# Patient Record
Sex: FEMALE | Race: BLACK OR AFRICAN AMERICAN | NOT HISPANIC OR LATINO | Employment: OTHER | URBAN - METROPOLITAN AREA
[De-identification: names, ages, dates, MRNs, and addresses within clinical notes are randomized per-mention and may not be internally consistent; named-entity substitution may affect disease eponyms.]

---

## 2017-04-10 PROBLEM — E78.5 HYPERLIPIDEMIA: Status: ACTIVE | Noted: 2017-04-10

## 2017-09-11 PROBLEM — R21 RASH, SKIN: Status: ACTIVE | Noted: 2017-09-11

## 2017-10-18 PROBLEM — Z17.0 MALIGNANT NEOPLASM OF UPPER-OUTER QUADRANT OF LEFT BREAST IN FEMALE, ESTROGEN RECEPTOR POSITIVE: Status: ACTIVE | Noted: 2017-10-18

## 2017-10-18 PROBLEM — G58.9 PINCHED NERVE IN NECK: Status: ACTIVE | Noted: 2017-10-18

## 2017-10-18 PROBLEM — C50.412 MALIGNANT NEOPLASM OF UPPER-OUTER QUADRANT OF LEFT BREAST IN FEMALE, ESTROGEN RECEPTOR POSITIVE: Status: ACTIVE | Noted: 2017-10-18

## 2021-07-09 ENCOUNTER — HOSPITAL ENCOUNTER (OUTPATIENT)
Facility: HOSPITAL | Age: 86
Discharge: HOME | End: 2021-07-09
Attending: EMERGENCY MEDICINE | Admitting: EMERGENCY MEDICINE
Payer: MEDICARE

## 2021-07-09 VITALS
BODY MASS INDEX: 32.2 KG/M2 | TEMPERATURE: 99 F | HEIGHT: 62 IN | SYSTOLIC BLOOD PRESSURE: 154 MMHG | HEART RATE: 72 BPM | OXYGEN SATURATION: 96 % | DIASTOLIC BLOOD PRESSURE: 63 MMHG | RESPIRATION RATE: 18 BRPM | WEIGHT: 175 LBS

## 2021-07-09 DIAGNOSIS — I21.4 NSTEMI (NON-ST ELEVATED MYOCARDIAL INFARCTION): Primary | ICD-10-CM

## 2021-07-09 DIAGNOSIS — R06.02 SHORTNESS OF BREATH: ICD-10-CM

## 2021-07-09 DIAGNOSIS — I50.31 ACUTE DIASTOLIC CONGESTIVE HEART FAILURE: ICD-10-CM

## 2021-07-09 DIAGNOSIS — R07.9 CHEST PAIN: ICD-10-CM

## 2021-07-09 DIAGNOSIS — I50.9 CHF (CONGESTIVE HEART FAILURE): ICD-10-CM

## 2021-07-09 DIAGNOSIS — R53.81 PHYSICAL DECONDITIONING: ICD-10-CM

## 2021-07-09 PROBLEM — E83.51 HYPOCALCEMIA: Status: ACTIVE | Noted: 2020-01-24

## 2021-07-09 PROBLEM — E87.5 HYPERKALEMIA: Status: ACTIVE | Noted: 2021-06-26

## 2021-07-09 PROBLEM — I35.0 NONRHEUMATIC AORTIC VALVE STENOSIS: Status: ACTIVE | Noted: 2020-09-27

## 2021-07-09 PROBLEM — C79.51 SECONDARY MALIGNANT NEOPLASM OF BONE: Status: ACTIVE | Noted: 2018-03-29

## 2021-07-09 PROBLEM — E11.9 DIABETES MELLITUS: Status: ACTIVE | Noted: 2019-02-07

## 2021-07-09 PROBLEM — I50.33 ACUTE ON CHRONIC DIASTOLIC (CONGESTIVE) HEART FAILURE: Status: ACTIVE | Noted: 2020-01-24

## 2021-07-09 PROBLEM — E83.42 HYPOMAGNESEMIA: Status: ACTIVE | Noted: 2020-01-24

## 2021-07-09 PROBLEM — E73.9 LACTOSE INTOLERANCE: Status: ACTIVE | Noted: 2020-11-02

## 2021-07-09 PROBLEM — I48.91 ATRIAL FIBRILLATION WITH RAPID VENTRICULAR RESPONSE: Status: ACTIVE | Noted: 2020-08-30

## 2021-07-09 PROBLEM — I35.0 SEVERE AORTIC STENOSIS: Status: ACTIVE | Noted: 2020-09-08

## 2021-07-09 PROBLEM — R79.89 ELEVATED TROPONIN: Status: ACTIVE | Noted: 2020-08-29

## 2021-07-09 PROBLEM — I97.2 POSTMASTECTOMY LYMPHEDEMA SYNDROME: Status: ACTIVE | Noted: 2019-02-07

## 2021-07-09 PROBLEM — I35.2 AORTIC VALVE STENOSIS WITH INSUFFICIENCY: Status: ACTIVE | Noted: 2020-09-18

## 2021-07-09 PROBLEM — I08.0 STENOSIS OF AORTIC AND MITRAL VALVES: Status: ACTIVE | Noted: 2021-07-09

## 2021-07-09 PROBLEM — F33.0 MAJOR DEPRESSIVE DISORDER, RECURRENT EPISODE, MILD: Status: ACTIVE | Noted: 2020-10-01

## 2021-07-09 PROBLEM — E61.1 IRON DEFICIENCY: Status: ACTIVE | Noted: 2020-11-02

## 2021-07-09 PROBLEM — I34.81 MITRAL ANNULAR CALCIFICATION: Status: ACTIVE | Noted: 2021-07-09

## 2021-07-09 LAB
ALBUMIN SERPL BCP-MCNC: 3.4 G/DL (ref 3.5–5.2)
ALBUMIN SERPL BCP-MCNC: 3.4 G/DL (ref 3.5–5.2)
ALLENS TEST: ABNORMAL
ALP SERPL-CCNC: 72 U/L (ref 55–135)
ALP SERPL-CCNC: 75 U/L (ref 55–135)
ALT SERPL W/O P-5'-P-CCNC: 45 U/L (ref 10–44)
ALT SERPL W/O P-5'-P-CCNC: 49 U/L (ref 10–44)
ANION GAP SERPL CALC-SCNC: 11 MMOL/L (ref 8–16)
ANION GAP SERPL CALC-SCNC: 13 MMOL/L (ref 8–16)
AORTIC ROOT ANNULUS: 4.41 CM
AORTIC VALVE CUSP SEPERATION: 1.18 CM
ASCENDING AORTA: 3.28 CM
AST SERPL-CCNC: 53 U/L (ref 10–40)
AST SERPL-CCNC: 64 U/L (ref 10–40)
AV INDEX (PROSTH): 0.26
AV MEAN GRADIENT: 51 MMHG
AV PEAK GRADIENT: 84 MMHG
AV VALVE AREA: 0.77 CM2
AV VELOCITY RATIO: 0.29
BASOPHILS # BLD AUTO: 0.02 K/UL (ref 0–0.2)
BASOPHILS # BLD AUTO: 0.03 K/UL (ref 0–0.2)
BASOPHILS NFR BLD: 0.2 % (ref 0–1.9)
BASOPHILS NFR BLD: 0.3 % (ref 0–1.9)
BILIRUB SERPL-MCNC: 0.5 MG/DL (ref 0.1–1)
BILIRUB SERPL-MCNC: 0.6 MG/DL (ref 0.1–1)
BNP SERPL-MCNC: 612 PG/ML (ref 0–99)
BSA FOR ECHO PROCEDURE: 1.86 M2
BUN SERPL-MCNC: 14 MG/DL (ref 8–23)
BUN SERPL-MCNC: 14 MG/DL (ref 8–23)
CALCIUM SERPL-MCNC: 9.4 MG/DL (ref 8.7–10.5)
CALCIUM SERPL-MCNC: 9.4 MG/DL (ref 8.7–10.5)
CHLORIDE SERPL-SCNC: 101 MMOL/L (ref 95–110)
CHLORIDE SERPL-SCNC: 104 MMOL/L (ref 95–110)
CHOLEST SERPL-MCNC: 167 MG/DL (ref 120–199)
CHOLEST/HDLC SERPL: 3.3 {RATIO} (ref 2–5)
CO2 SERPL-SCNC: 23 MMOL/L (ref 23–29)
CO2 SERPL-SCNC: 23 MMOL/L (ref 23–29)
CREAT SERPL-MCNC: 0.9 MG/DL (ref 0.5–1.4)
CREAT SERPL-MCNC: 1 MG/DL (ref 0.5–1.4)
CTP QC/QA: YES
CV ECHO LV RWT: 1.23 CM
DELSYS: ABNORMAL
DIFFERENTIAL METHOD: ABNORMAL
DIFFERENTIAL METHOD: ABNORMAL
DOP CALC AO PEAK VEL: 4.58 M/S
DOP CALC AO VTI: 107.99 CM
DOP CALC LVOT AREA: 3 CM2
DOP CALC LVOT DIAMETER: 1.96 CM
DOP CALC LVOT PEAK VEL: 1.31 M/S
DOP CALC LVOT STROKE VOLUME: 83.68 CM3
DOP CALCLVOT PEAK VEL VTI: 27.75 CM
E WAVE DECELERATION TIME: 380.12 MSEC
E/A RATIO: 0.75
E/E' RATIO: 38.29 M/S
ECHO LV POSTERIOR WALL: 1.96 CM (ref 0.6–1.1)
EJECTION FRACTION: 60 %
EOSINOPHIL # BLD AUTO: 0 K/UL (ref 0–0.5)
EOSINOPHIL # BLD AUTO: 0.1 K/UL (ref 0–0.5)
EOSINOPHIL NFR BLD: 0 % (ref 0–8)
EOSINOPHIL NFR BLD: 0.7 % (ref 0–8)
EP: 5
ERYTHROCYTE [DISTWIDTH] IN BLOOD BY AUTOMATED COUNT: 13.4 % (ref 11.5–14.5)
ERYTHROCYTE [DISTWIDTH] IN BLOOD BY AUTOMATED COUNT: 13.6 % (ref 11.5–14.5)
ERYTHROCYTE [SEDIMENTATION RATE] IN BLOOD BY WESTERGREN METHOD: 12 MM/H
EST. GFR  (AFRICAN AMERICAN): 60 ML/MIN/1.73 M^2
EST. GFR  (AFRICAN AMERICAN): >60 ML/MIN/1.73 M^2
EST. GFR  (NON AFRICAN AMERICAN): 52 ML/MIN/1.73 M^2
EST. GFR  (NON AFRICAN AMERICAN): 59 ML/MIN/1.73 M^2
FIO2: 35
FRACTIONAL SHORTENING: 32 % (ref 28–44)
GLUCOSE SERPL-MCNC: 131 MG/DL (ref 70–110)
GLUCOSE SERPL-MCNC: 154 MG/DL (ref 70–110)
HCO3 UR-SCNC: 29.4 MMOL/L (ref 24–28)
HCT VFR BLD AUTO: 33.4 % (ref 37–48.5)
HCT VFR BLD AUTO: 34.5 % (ref 37–48.5)
HDLC SERPL-MCNC: 51 MG/DL (ref 40–75)
HDLC SERPL: 30.5 % (ref 20–50)
HGB BLD-MCNC: 11.5 G/DL (ref 12–16)
HGB BLD-MCNC: 11.5 G/DL (ref 12–16)
IMM GRANULOCYTES # BLD AUTO: 0.04 K/UL (ref 0–0.04)
IMM GRANULOCYTES # BLD AUTO: 0.05 K/UL (ref 0–0.04)
IMM GRANULOCYTES NFR BLD AUTO: 0.5 % (ref 0–0.5)
IMM GRANULOCYTES NFR BLD AUTO: 0.5 % (ref 0–0.5)
INR PPP: 1.1 (ref 0.8–1.2)
INTERVENTRICULAR SEPTUM: 2.01 CM (ref 0.6–1.1)
IP: 10
IVRT: 113.8 MSEC
LA MAJOR: 6.73 CM
LA MINOR: 6.73 CM
LA WIDTH: 5.17 CM
LDLC SERPL CALC-MCNC: 101.4 MG/DL (ref 63–159)
LEFT ATRIUM SIZE: 2.75 CM
LEFT ATRIUM VOLUME INDEX: 44.9 ML/M2
LEFT ATRIUM VOLUME: 81.33 CM3
LEFT INTERNAL DIMENSION IN SYSTOLE: 2.17 CM (ref 2.1–4)
LEFT VENTRICLE DIASTOLIC VOLUME INDEX: 22.31 ML/M2
LEFT VENTRICLE DIASTOLIC VOLUME: 40.39 ML
LEFT VENTRICLE MASS INDEX: 154 G/M2
LEFT VENTRICLE SYSTOLIC VOLUME INDEX: 8.7 ML/M2
LEFT VENTRICLE SYSTOLIC VOLUME: 15.74 ML
LEFT VENTRICULAR INTERNAL DIMENSION IN DIASTOLE: 3.18 CM (ref 3.5–6)
LEFT VENTRICULAR MASS: 277.96 G
LV LATERAL E/E' RATIO: 33.5 M/S
LV SEPTAL E/E' RATIO: 44.67 M/S
LYMPHOCYTES # BLD AUTO: 0.7 K/UL (ref 1–4.8)
LYMPHOCYTES # BLD AUTO: 1.2 K/UL (ref 1–4.8)
LYMPHOCYTES NFR BLD: 12.5 % (ref 18–48)
LYMPHOCYTES NFR BLD: 8.5 % (ref 18–48)
MAGNESIUM SERPL-MCNC: 1.7 MG/DL (ref 1.6–2.6)
MAGNESIUM SERPL-MCNC: 1.9 MG/DL (ref 1.6–2.6)
MCH RBC QN AUTO: 32.1 PG (ref 27–31)
MCH RBC QN AUTO: 32.2 PG (ref 27–31)
MCHC RBC AUTO-ENTMCNC: 33.3 G/DL (ref 32–36)
MCHC RBC AUTO-ENTMCNC: 34.4 G/DL (ref 32–36)
MCV RBC AUTO: 93 FL (ref 82–98)
MCV RBC AUTO: 97 FL (ref 82–98)
MIN VOL: 8
MODE: ABNORMAL
MONOCYTES # BLD AUTO: 0.5 K/UL (ref 0.3–1)
MONOCYTES # BLD AUTO: 0.6 K/UL (ref 0.3–1)
MONOCYTES NFR BLD: 5.7 % (ref 4–15)
MONOCYTES NFR BLD: 6.9 % (ref 4–15)
MV MEAN GRADIENT: 1 MMHG
MV PEAK A VEL: 1.78 M/S
MV PEAK E VEL: 1.34 M/S
MV PEAK GRADIENT: 15 MMHG
MV STENOSIS PRESSURE HALF TIME: 110.23 MS
MV VALVE AREA P 1/2 METHOD: 2 CM2
NEUTROPHILS # BLD AUTO: 6.8 K/UL (ref 1.8–7.7)
NEUTROPHILS # BLD AUTO: 7.4 K/UL (ref 1.8–7.7)
NEUTROPHILS NFR BLD: 80.3 % (ref 38–73)
NEUTROPHILS NFR BLD: 83.9 % (ref 38–73)
NONHDLC SERPL-MCNC: 116 MG/DL
NRBC BLD-RTO: 0 /100 WBC
NRBC BLD-RTO: 0 /100 WBC
PCO2 BLDA: 49.5 MMHG (ref 35–45)
PH SMN: 7.38 [PH] (ref 7.35–7.45)
PHOSPHATE SERPL-MCNC: 3.9 MG/DL (ref 2.7–4.5)
PISA TR MAX VEL: 2.56 M/S
PLATELET # BLD AUTO: 197 K/UL (ref 150–450)
PLATELET # BLD AUTO: 212 K/UL (ref 150–450)
PMV BLD AUTO: 10.1 FL (ref 9.2–12.9)
PMV BLD AUTO: 10.9 FL (ref 9.2–12.9)
PO2 BLDA: 43 MMHG (ref 40–60)
POC BE: 4 MMOL/L
POC SATURATED O2: 77 % (ref 95–100)
POC TCO2: 31 MMOL/L (ref 24–29)
POCT GLUCOSE: 111 MG/DL (ref 70–110)
POCT GLUCOSE: 114 MG/DL (ref 70–110)
POCT GLUCOSE: 119 MG/DL (ref 70–110)
POTASSIUM SERPL-SCNC: 3.8 MMOL/L (ref 3.5–5.1)
POTASSIUM SERPL-SCNC: 4.4 MMOL/L (ref 3.5–5.1)
PROT SERPL-MCNC: 6.4 G/DL (ref 6–8.4)
PROT SERPL-MCNC: 6.8 G/DL (ref 6–8.4)
PROTHROMBIN TIME: 11.6 SEC (ref 9–12.5)
PULM VEIN S/D RATIO: 0.88
PV PEAK D VEL: 0.33 M/S
PV PEAK S VEL: 0.29 M/S
PV PEAK VELOCITY: 1.11 CM/S
RA MAJOR: 5.9 CM
RA PRESSURE: 3 MMHG
RA WIDTH: 2.75 CM
RBC # BLD AUTO: 3.57 M/UL (ref 4–5.4)
RBC # BLD AUTO: 3.58 M/UL (ref 4–5.4)
RIGHT VENTRICULAR END-DIASTOLIC DIMENSION: 3.72 CM
RV TISSUE DOPPLER FREE WALL SYSTOLIC VELOCITY 1 (APICAL 4 CHAMBER VIEW): 7.78 CM/S
SAMPLE: ABNORMAL
SARS-COV-2 RDRP RESP QL NAA+PROBE: NEGATIVE
SINUS: 4.04 CM
SITE: ABNORMAL
SODIUM SERPL-SCNC: 137 MMOL/L (ref 136–145)
SODIUM SERPL-SCNC: 138 MMOL/L (ref 136–145)
SP02: 97
SPONT RATE: 23
STJ: 3.17 CM
T4 FREE SERPL-MCNC: 1.09 NG/DL (ref 0.71–1.51)
TDI LATERAL: 0.04 M/S
TDI SEPTAL: 0.03 M/S
TDI: 0.04 M/S
TR MAX PG: 26 MMHG
TRICUSPID ANNULAR PLANE SYSTOLIC EXCURSION: 1.29 CM
TRIGL SERPL-MCNC: 73 MG/DL (ref 30–150)
TROPONIN I SERPL DL<=0.01 NG/ML-MCNC: 0.44 NG/ML (ref 0–0.03)
TROPONIN I SERPL DL<=0.01 NG/ML-MCNC: 0.49 NG/ML (ref 0–0.03)
TROPONIN I SERPL DL<=0.01 NG/ML-MCNC: 0.49 NG/ML (ref 0–0.03)
TSH SERPL DL<=0.005 MIU/L-ACNC: 6.76 UIU/ML (ref 0.4–4)
TV REST PULMONARY ARTERY PRESSURE: 29 MMHG
WBC # BLD AUTO: 8.11 K/UL (ref 3.9–12.7)
WBC # BLD AUTO: 9.17 K/UL (ref 3.9–12.7)

## 2021-07-09 PROCEDURE — 93010 EKG 12-LEAD: ICD-10-PCS | Mod: ,,, | Performed by: INTERNAL MEDICINE

## 2021-07-09 PROCEDURE — 25000003 PHARM REV CODE 250: Performed by: NURSE PRACTITIONER

## 2021-07-09 PROCEDURE — 83036 HEMOGLOBIN GLYCOSYLATED A1C: CPT | Performed by: NURSE PRACTITIONER

## 2021-07-09 PROCEDURE — 93005 ELECTROCARDIOGRAM TRACING: CPT

## 2021-07-09 PROCEDURE — 27000221 HC OXYGEN, UP TO 24 HOURS

## 2021-07-09 PROCEDURE — 80053 COMPREHEN METABOLIC PANEL: CPT | Mod: 91 | Performed by: EMERGENCY MEDICINE

## 2021-07-09 PROCEDURE — 99220 PR INITIAL OBSERVATION CARE,LEVL III: ICD-10-PCS | Mod: 25,GW,, | Performed by: INTERNAL MEDICINE

## 2021-07-09 PROCEDURE — 82803 BLOOD GASES ANY COMBINATION: CPT

## 2021-07-09 PROCEDURE — 80053 COMPREHEN METABOLIC PANEL: CPT | Performed by: NURSE PRACTITIONER

## 2021-07-09 PROCEDURE — 82962 GLUCOSE BLOOD TEST: CPT

## 2021-07-09 PROCEDURE — 84484 ASSAY OF TROPONIN QUANT: CPT | Mod: 91 | Performed by: NURSE PRACTITIONER

## 2021-07-09 PROCEDURE — 96376 TX/PRO/DX INJ SAME DRUG ADON: CPT | Mod: 59

## 2021-07-09 PROCEDURE — 25000003 PHARM REV CODE 250: Performed by: EMERGENCY MEDICINE

## 2021-07-09 PROCEDURE — 94660 CPAP INITIATION&MGMT: CPT

## 2021-07-09 PROCEDURE — 93010 ELECTROCARDIOGRAM REPORT: CPT | Mod: ,,, | Performed by: INTERNAL MEDICINE

## 2021-07-09 PROCEDURE — 99900035 HC TECH TIME PER 15 MIN (STAT)

## 2021-07-09 PROCEDURE — 96374 THER/PROPH/DIAG INJ IV PUSH: CPT | Mod: 59

## 2021-07-09 PROCEDURE — 99220 PR INITIAL OBSERVATION CARE,LEVL III: CPT | Mod: 25,GW,, | Performed by: INTERNAL MEDICINE

## 2021-07-09 PROCEDURE — 94640 AIRWAY INHALATION TREATMENT: CPT

## 2021-07-09 PROCEDURE — 97165 OT EVAL LOW COMPLEX 30 MIN: CPT

## 2021-07-09 PROCEDURE — 83735 ASSAY OF MAGNESIUM: CPT | Mod: 91 | Performed by: EMERGENCY MEDICINE

## 2021-07-09 PROCEDURE — 36415 COLL VENOUS BLD VENIPUNCTURE: CPT | Performed by: NURSE PRACTITIONER

## 2021-07-09 PROCEDURE — 84439 ASSAY OF FREE THYROXINE: CPT | Performed by: NURSE PRACTITIONER

## 2021-07-09 PROCEDURE — 85610 PROTHROMBIN TIME: CPT | Performed by: NURSE PRACTITIONER

## 2021-07-09 PROCEDURE — U0002 COVID-19 LAB TEST NON-CDC: HCPCS | Performed by: EMERGENCY MEDICINE

## 2021-07-09 PROCEDURE — 99291 CRITICAL CARE FIRST HOUR: CPT | Mod: 25

## 2021-07-09 PROCEDURE — 96372 THER/PROPH/DIAG INJ SC/IM: CPT | Mod: 59

## 2021-07-09 PROCEDURE — 84484 ASSAY OF TROPONIN QUANT: CPT | Performed by: EMERGENCY MEDICINE

## 2021-07-09 PROCEDURE — G0378 HOSPITAL OBSERVATION PER HR: HCPCS

## 2021-07-09 PROCEDURE — 63600175 PHARM REV CODE 636 W HCPCS: Performed by: NURSE PRACTITIONER

## 2021-07-09 PROCEDURE — 85025 COMPLETE CBC W/AUTO DIFF WBC: CPT | Performed by: NURSE PRACTITIONER

## 2021-07-09 PROCEDURE — 84100 ASSAY OF PHOSPHORUS: CPT | Performed by: NURSE PRACTITIONER

## 2021-07-09 PROCEDURE — 27000190 HC CPAP FULL FACE MASK W/VALVE

## 2021-07-09 PROCEDURE — 83880 ASSAY OF NATRIURETIC PEPTIDE: CPT | Performed by: EMERGENCY MEDICINE

## 2021-07-09 PROCEDURE — 97161 PT EVAL LOW COMPLEX 20 MIN: CPT

## 2021-07-09 PROCEDURE — 84443 ASSAY THYROID STIM HORMONE: CPT | Performed by: NURSE PRACTITIONER

## 2021-07-09 PROCEDURE — 83735 ASSAY OF MAGNESIUM: CPT | Performed by: NURSE PRACTITIONER

## 2021-07-09 PROCEDURE — 80061 LIPID PANEL: CPT | Performed by: NURSE PRACTITIONER

## 2021-07-09 PROCEDURE — 25000242 PHARM REV CODE 250 ALT 637 W/ HCPCS: Performed by: EMERGENCY MEDICINE

## 2021-07-09 PROCEDURE — 63600175 PHARM REV CODE 636 W HCPCS: Performed by: EMERGENCY MEDICINE

## 2021-07-09 PROCEDURE — 85025 COMPLETE CBC W/AUTO DIFF WBC: CPT | Mod: 91 | Performed by: EMERGENCY MEDICINE

## 2021-07-09 RX ORDER — IBUPROFEN 200 MG
16 TABLET ORAL
Status: DISCONTINUED | OUTPATIENT
Start: 2021-07-09 | End: 2021-07-09 | Stop reason: HOSPADM

## 2021-07-09 RX ORDER — NITROGLYCERIN 0.4 MG/1
0.4 TABLET SUBLINGUAL EVERY 5 MIN PRN
Status: DISCONTINUED | OUTPATIENT
Start: 2021-07-09 | End: 2021-07-09 | Stop reason: HOSPADM

## 2021-07-09 RX ORDER — ATORVASTATIN CALCIUM 10 MG/1
20 TABLET, FILM COATED ORAL DAILY
Status: DISCONTINUED | OUTPATIENT
Start: 2021-07-09 | End: 2021-07-09 | Stop reason: HOSPADM

## 2021-07-09 RX ORDER — INSULIN ASPART 100 [IU]/ML
0-5 INJECTION, SOLUTION INTRAVENOUS; SUBCUTANEOUS
Status: DISCONTINUED | OUTPATIENT
Start: 2021-07-09 | End: 2021-07-09 | Stop reason: HOSPADM

## 2021-07-09 RX ORDER — FUROSEMIDE 20 MG/1
20 TABLET ORAL DAILY
Qty: 30 TABLET | Refills: 11 | Status: ON HOLD | OUTPATIENT
Start: 2021-07-09 | End: 2022-01-02 | Stop reason: SDUPTHER

## 2021-07-09 RX ORDER — ACETAMINOPHEN 500 MG
500 TABLET ORAL EVERY 6 HOURS PRN
Status: DISCONTINUED | OUTPATIENT
Start: 2021-07-09 | End: 2021-07-09 | Stop reason: HOSPADM

## 2021-07-09 RX ORDER — METOPROLOL SUCCINATE 25 MG/1
25 TABLET, EXTENDED RELEASE ORAL DAILY
Status: DISCONTINUED | OUTPATIENT
Start: 2021-07-09 | End: 2021-07-09 | Stop reason: HOSPADM

## 2021-07-09 RX ORDER — ONDANSETRON 2 MG/ML
4 INJECTION INTRAMUSCULAR; INTRAVENOUS EVERY 8 HOURS PRN
Status: DISCONTINUED | OUTPATIENT
Start: 2021-07-09 | End: 2021-07-09 | Stop reason: HOSPADM

## 2021-07-09 RX ORDER — FUROSEMIDE 10 MG/ML
40 INJECTION INTRAMUSCULAR; INTRAVENOUS
Status: DISCONTINUED | OUTPATIENT
Start: 2021-07-09 | End: 2021-07-09 | Stop reason: HOSPADM

## 2021-07-09 RX ORDER — CLOPIDOGREL BISULFATE 75 MG/1
75 TABLET ORAL DAILY
Status: DISCONTINUED | OUTPATIENT
Start: 2021-07-10 | End: 2021-07-09 | Stop reason: HOSPADM

## 2021-07-09 RX ORDER — IPRATROPIUM BROMIDE AND ALBUTEROL SULFATE 2.5; .5 MG/3ML; MG/3ML
3 SOLUTION RESPIRATORY (INHALATION)
Status: COMPLETED | OUTPATIENT
Start: 2021-07-09 | End: 2021-07-09

## 2021-07-09 RX ORDER — LOSARTAN POTASSIUM 25 MG/1
50 TABLET ORAL DAILY
Refills: 0 | Status: DISCONTINUED | OUTPATIENT
Start: 2021-07-09 | End: 2021-07-09 | Stop reason: HOSPADM

## 2021-07-09 RX ORDER — CLOPIDOGREL 300 MG/1
300 TABLET, FILM COATED ORAL
Status: COMPLETED | OUTPATIENT
Start: 2021-07-09 | End: 2021-07-09

## 2021-07-09 RX ORDER — FUROSEMIDE 10 MG/ML
40 INJECTION INTRAMUSCULAR; INTRAVENOUS
Status: COMPLETED | OUTPATIENT
Start: 2021-07-09 | End: 2021-07-09

## 2021-07-09 RX ORDER — IPRATROPIUM BROMIDE AND ALBUTEROL SULFATE 2.5; .5 MG/3ML; MG/3ML
3 SOLUTION RESPIRATORY (INHALATION) EVERY 6 HOURS PRN
Qty: 1 BOX | Refills: 0 | Status: SHIPPED | OUTPATIENT
Start: 2021-07-09 | End: 2022-07-09

## 2021-07-09 RX ORDER — GLUCAGON 1 MG
1 KIT INJECTION
Status: DISCONTINUED | OUTPATIENT
Start: 2021-07-09 | End: 2021-07-09 | Stop reason: HOSPADM

## 2021-07-09 RX ORDER — ASPIRIN 81 MG/1
81 TABLET ORAL DAILY
Status: DISCONTINUED | OUTPATIENT
Start: 2021-07-10 | End: 2021-07-09 | Stop reason: HOSPADM

## 2021-07-09 RX ORDER — ATORVASTATIN CALCIUM 20 MG/1
20 TABLET, FILM COATED ORAL DAILY
Qty: 90 TABLET | Refills: 3 | Status: SHIPPED | OUTPATIENT
Start: 2021-07-10 | End: 2022-07-10

## 2021-07-09 RX ORDER — CALCIUM CARBONATE 200(500)MG
500 TABLET,CHEWABLE ORAL 3 TIMES DAILY PRN
Status: DISCONTINUED | OUTPATIENT
Start: 2021-07-09 | End: 2021-07-09 | Stop reason: HOSPADM

## 2021-07-09 RX ORDER — CLOPIDOGREL BISULFATE 75 MG/1
75 TABLET ORAL DAILY
Qty: 30 TABLET | Refills: 11 | Status: ON HOLD | OUTPATIENT
Start: 2021-07-10 | End: 2022-01-02 | Stop reason: HOSPADM

## 2021-07-09 RX ORDER — ASPIRIN 81 MG/1
81 TABLET ORAL DAILY
Refills: 0
Start: 2021-07-10 | End: 2022-07-10

## 2021-07-09 RX ORDER — TALC
6 POWDER (GRAM) TOPICAL NIGHTLY PRN
Status: DISCONTINUED | OUTPATIENT
Start: 2021-07-09 | End: 2021-07-09 | Stop reason: HOSPADM

## 2021-07-09 RX ORDER — NAPROXEN SODIUM 220 MG/1
81 TABLET, FILM COATED ORAL
Status: COMPLETED | OUTPATIENT
Start: 2021-07-09 | End: 2021-07-09

## 2021-07-09 RX ORDER — AMOXICILLIN 250 MG
1 CAPSULE ORAL 2 TIMES DAILY
Status: DISCONTINUED | OUTPATIENT
Start: 2021-07-09 | End: 2021-07-09 | Stop reason: HOSPADM

## 2021-07-09 RX ORDER — ENOXAPARIN SODIUM 100 MG/ML
1 INJECTION SUBCUTANEOUS ONCE
Status: COMPLETED | OUTPATIENT
Start: 2021-07-09 | End: 2021-07-09

## 2021-07-09 RX ORDER — IBUPROFEN 200 MG
24 TABLET ORAL
Status: DISCONTINUED | OUTPATIENT
Start: 2021-07-09 | End: 2021-07-09 | Stop reason: HOSPADM

## 2021-07-09 RX ORDER — ENOXAPARIN SODIUM 100 MG/ML
40 INJECTION SUBCUTANEOUS EVERY 24 HOURS
Status: DISCONTINUED | OUTPATIENT
Start: 2021-07-09 | End: 2021-07-09 | Stop reason: HOSPADM

## 2021-07-09 RX ORDER — SODIUM CHLORIDE 0.9 % (FLUSH) 0.9 %
10 SYRINGE (ML) INJECTION
Status: DISCONTINUED | OUTPATIENT
Start: 2021-07-09 | End: 2021-07-09 | Stop reason: HOSPADM

## 2021-07-09 RX ADMIN — ASPIRIN 81 MG CHEWABLE TABLET 81 MG: 81 TABLET CHEWABLE at 04:07

## 2021-07-09 RX ADMIN — IPRATROPIUM BROMIDE AND ALBUTEROL SULFATE 3 ML: .5; 3 SOLUTION RESPIRATORY (INHALATION) at 02:07

## 2021-07-09 RX ADMIN — ATORVASTATIN CALCIUM 20 MG: 10 TABLET, FILM COATED ORAL at 12:07

## 2021-07-09 RX ADMIN — METOPROLOL SUCCINATE 25 MG: 25 TABLET, EXTENDED RELEASE ORAL at 12:07

## 2021-07-09 RX ADMIN — DOCUSATE SODIUM 50 MG AND SENNOSIDES 8.6 MG 1 TABLET: 8.6; 5 TABLET, FILM COATED ORAL at 10:07

## 2021-07-09 RX ADMIN — CLOPIDOGREL BISULFATE 300 MG: 300 TABLET, FILM COATED ORAL at 04:07

## 2021-07-09 RX ADMIN — ENOXAPARIN SODIUM 80 MG: 80 INJECTION SUBCUTANEOUS at 04:07

## 2021-07-09 RX ADMIN — FUROSEMIDE 40 MG: 10 INJECTION, SOLUTION INTRAMUSCULAR; INTRAVENOUS at 12:07

## 2021-07-09 RX ADMIN — LOSARTAN POTASSIUM 50 MG: 25 TABLET, FILM COATED ORAL at 12:07

## 2021-07-09 RX ADMIN — FUROSEMIDE 40 MG: 10 INJECTION INTRAMUSCULAR; INTRAVENOUS at 10:07

## 2021-07-09 RX ADMIN — IPRATROPIUM BROMIDE AND ALBUTEROL SULFATE 3 ML: .5; 3 SOLUTION RESPIRATORY (INHALATION) at 12:07

## 2021-07-10 LAB
ESTIMATED AVG GLUCOSE: 131 MG/DL (ref 68–131)
HBA1C MFR BLD: 6.2 % (ref 4–5.6)

## 2021-07-27 ENCOUNTER — LAB VISIT (OUTPATIENT)
Dept: LAB | Facility: OTHER | Age: 86
End: 2021-07-27
Payer: MEDICARE

## 2021-07-27 DIAGNOSIS — Z20.822 ENCOUNTER FOR LABORATORY TESTING FOR COVID-19 VIRUS: ICD-10-CM

## 2021-07-27 PROCEDURE — U0003 INFECTIOUS AGENT DETECTION BY NUCLEIC ACID (DNA OR RNA); SEVERE ACUTE RESPIRATORY SYNDROME CORONAVIRUS 2 (SARS-COV-2) (CORONAVIRUS DISEASE [COVID-19]), AMPLIFIED PROBE TECHNIQUE, MAKING USE OF HIGH THROUGHPUT TECHNOLOGIES AS DESCRIBED BY CMS-2020-01-R: HCPCS | Performed by: NURSE PRACTITIONER

## 2021-07-28 DIAGNOSIS — Z20.822 ENCOUNTER FOR LABORATORY TESTING FOR COVID-19 VIRUS: ICD-10-CM

## 2021-07-29 LAB
SARS-COV-2 RNA RESP QL NAA+PROBE: NOT DETECTED
SARS-COV-2- CYCLE NUMBER: -1

## 2021-08-03 ENCOUNTER — LAB VISIT (OUTPATIENT)
Dept: LAB | Facility: OTHER | Age: 86
End: 2021-08-03
Payer: MEDICARE

## 2021-08-03 DIAGNOSIS — Z20.822 ENCOUNTER FOR LABORATORY TESTING FOR COVID-19 VIRUS: ICD-10-CM

## 2021-08-03 PROCEDURE — U0003 INFECTIOUS AGENT DETECTION BY NUCLEIC ACID (DNA OR RNA); SEVERE ACUTE RESPIRATORY SYNDROME CORONAVIRUS 2 (SARS-COV-2) (CORONAVIRUS DISEASE [COVID-19]), AMPLIFIED PROBE TECHNIQUE, MAKING USE OF HIGH THROUGHPUT TECHNOLOGIES AS DESCRIBED BY CMS-2020-01-R: HCPCS | Performed by: NURSE PRACTITIONER

## 2021-08-06 LAB
SARS-COV-2 RNA RESP QL NAA+PROBE: NORMAL
TEST PERFORMANCE INFO SPEC: NORMAL

## 2021-08-10 ENCOUNTER — LAB VISIT (OUTPATIENT)
Dept: LAB | Facility: OTHER | Age: 86
End: 2021-08-10
Payer: MEDICARE

## 2021-08-10 DIAGNOSIS — Z20.822 ENCOUNTER FOR LABORATORY TESTING FOR COVID-19 VIRUS: ICD-10-CM

## 2021-08-10 PROCEDURE — U0003 INFECTIOUS AGENT DETECTION BY NUCLEIC ACID (DNA OR RNA); SEVERE ACUTE RESPIRATORY SYNDROME CORONAVIRUS 2 (SARS-COV-2) (CORONAVIRUS DISEASE [COVID-19]), AMPLIFIED PROBE TECHNIQUE, MAKING USE OF HIGH THROUGHPUT TECHNOLOGIES AS DESCRIBED BY CMS-2020-01-R: HCPCS | Performed by: NURSE PRACTITIONER

## 2021-08-11 LAB
SARS-COV-2 RNA RESP QL NAA+PROBE: NOT DETECTED
SARS-COV-2- CYCLE NUMBER: -1

## 2021-08-17 ENCOUNTER — LAB VISIT (OUTPATIENT)
Dept: LAB | Facility: OTHER | Age: 86
End: 2021-08-17
Payer: MEDICARE

## 2021-08-17 DIAGNOSIS — Z20.822 ENCOUNTER FOR LABORATORY TESTING FOR COVID-19 VIRUS: ICD-10-CM

## 2021-08-17 PROCEDURE — U0003 INFECTIOUS AGENT DETECTION BY NUCLEIC ACID (DNA OR RNA); SEVERE ACUTE RESPIRATORY SYNDROME CORONAVIRUS 2 (SARS-COV-2) (CORONAVIRUS DISEASE [COVID-19]), AMPLIFIED PROBE TECHNIQUE, MAKING USE OF HIGH THROUGHPUT TECHNOLOGIES AS DESCRIBED BY CMS-2020-01-R: HCPCS | Performed by: NURSE PRACTITIONER

## 2021-08-18 LAB
SARS-COV-2 RNA RESP QL NAA+PROBE: NOT DETECTED
SARS-COV-2- CYCLE NUMBER: -1

## 2021-08-24 ENCOUNTER — LAB VISIT (OUTPATIENT)
Dept: LAB | Facility: OTHER | Age: 86
End: 2021-08-24
Payer: MEDICARE

## 2021-08-24 DIAGNOSIS — Z20.822 ENCOUNTER FOR LABORATORY TESTING FOR COVID-19 VIRUS: ICD-10-CM

## 2021-08-24 PROCEDURE — U0003 INFECTIOUS AGENT DETECTION BY NUCLEIC ACID (DNA OR RNA); SEVERE ACUTE RESPIRATORY SYNDROME CORONAVIRUS 2 (SARS-COV-2) (CORONAVIRUS DISEASE [COVID-19]), AMPLIFIED PROBE TECHNIQUE, MAKING USE OF HIGH THROUGHPUT TECHNOLOGIES AS DESCRIBED BY CMS-2020-01-R: HCPCS | Performed by: NURSE PRACTITIONER

## 2021-08-25 LAB
SARS-COV-2 RNA RESP QL NAA+PROBE: NORMAL
TEST PERFORMANCE INFO SPEC: NORMAL

## 2021-09-08 ENCOUNTER — LAB VISIT (OUTPATIENT)
Dept: LAB | Facility: OTHER | Age: 86
End: 2021-09-08
Payer: MEDICARE

## 2021-09-08 DIAGNOSIS — Z20.822 ENCOUNTER FOR LABORATORY TESTING FOR COVID-19 VIRUS: ICD-10-CM

## 2021-09-08 PROCEDURE — U0003 INFECTIOUS AGENT DETECTION BY NUCLEIC ACID (DNA OR RNA); SEVERE ACUTE RESPIRATORY SYNDROME CORONAVIRUS 2 (SARS-COV-2) (CORONAVIRUS DISEASE [COVID-19]), AMPLIFIED PROBE TECHNIQUE, MAKING USE OF HIGH THROUGHPUT TECHNOLOGIES AS DESCRIBED BY CMS-2020-01-R: HCPCS | Performed by: NURSE PRACTITIONER

## 2021-09-09 LAB
SARS-COV-2 RNA RESP QL NAA+PROBE: NOT DETECTED
SARS-COV-2- CYCLE NUMBER: NORMAL

## 2021-09-14 ENCOUNTER — LAB VISIT (OUTPATIENT)
Dept: LAB | Facility: OTHER | Age: 86
End: 2021-09-14
Payer: MEDICARE

## 2021-09-14 DIAGNOSIS — Z20.822 ENCOUNTER FOR LABORATORY TESTING FOR COVID-19 VIRUS: ICD-10-CM

## 2021-09-14 PROCEDURE — U0003 INFECTIOUS AGENT DETECTION BY NUCLEIC ACID (DNA OR RNA); SEVERE ACUTE RESPIRATORY SYNDROME CORONAVIRUS 2 (SARS-COV-2) (CORONAVIRUS DISEASE [COVID-19]), AMPLIFIED PROBE TECHNIQUE, MAKING USE OF HIGH THROUGHPUT TECHNOLOGIES AS DESCRIBED BY CMS-2020-01-R: HCPCS | Performed by: NURSE PRACTITIONER

## 2021-09-15 LAB
SARS-COV-2 RNA RESP QL NAA+PROBE: NOT DETECTED
SARS-COV-2- CYCLE NUMBER: NORMAL

## 2021-09-21 ENCOUNTER — LAB VISIT (OUTPATIENT)
Dept: LAB | Facility: OTHER | Age: 86
End: 2021-09-21
Payer: MEDICARE

## 2021-09-21 DIAGNOSIS — Z20.822 ENCOUNTER FOR LABORATORY TESTING FOR COVID-19 VIRUS: ICD-10-CM

## 2021-09-21 PROCEDURE — U0003 INFECTIOUS AGENT DETECTION BY NUCLEIC ACID (DNA OR RNA); SEVERE ACUTE RESPIRATORY SYNDROME CORONAVIRUS 2 (SARS-COV-2) (CORONAVIRUS DISEASE [COVID-19]), AMPLIFIED PROBE TECHNIQUE, MAKING USE OF HIGH THROUGHPUT TECHNOLOGIES AS DESCRIBED BY CMS-2020-01-R: HCPCS | Performed by: NURSE PRACTITIONER

## 2021-09-22 LAB
SARS-COV-2 RNA RESP QL NAA+PROBE: NOT DETECTED
SARS-COV-2- CYCLE NUMBER: NORMAL

## 2021-09-28 ENCOUNTER — LAB VISIT (OUTPATIENT)
Dept: LAB | Facility: OTHER | Age: 86
End: 2021-09-28
Payer: MEDICARE

## 2021-09-28 DIAGNOSIS — Z20.822 ENCOUNTER FOR LABORATORY TESTING FOR COVID-19 VIRUS: ICD-10-CM

## 2021-09-28 PROCEDURE — U0003 INFECTIOUS AGENT DETECTION BY NUCLEIC ACID (DNA OR RNA); SEVERE ACUTE RESPIRATORY SYNDROME CORONAVIRUS 2 (SARS-COV-2) (CORONAVIRUS DISEASE [COVID-19]), AMPLIFIED PROBE TECHNIQUE, MAKING USE OF HIGH THROUGHPUT TECHNOLOGIES AS DESCRIBED BY CMS-2020-01-R: HCPCS | Performed by: NURSE PRACTITIONER

## 2021-09-29 LAB
SARS-COV-2 RNA RESP QL NAA+PROBE: NOT DETECTED
SARS-COV-2- CYCLE NUMBER: NORMAL

## 2021-10-05 ENCOUNTER — LAB VISIT (OUTPATIENT)
Dept: LAB | Facility: OTHER | Age: 86
End: 2021-10-05
Payer: MEDICARE

## 2021-10-05 DIAGNOSIS — Z20.822 ENCOUNTER FOR LABORATORY TESTING FOR COVID-19 VIRUS: ICD-10-CM

## 2021-10-05 PROCEDURE — U0003 INFECTIOUS AGENT DETECTION BY NUCLEIC ACID (DNA OR RNA); SEVERE ACUTE RESPIRATORY SYNDROME CORONAVIRUS 2 (SARS-COV-2) (CORONAVIRUS DISEASE [COVID-19]), AMPLIFIED PROBE TECHNIQUE, MAKING USE OF HIGH THROUGHPUT TECHNOLOGIES AS DESCRIBED BY CMS-2020-01-R: HCPCS | Performed by: NURSE PRACTITIONER

## 2021-10-06 LAB
SARS-COV-2 RNA RESP QL NAA+PROBE: NOT DETECTED
SARS-COV-2- CYCLE NUMBER: NORMAL

## 2021-10-12 ENCOUNTER — LAB VISIT (OUTPATIENT)
Dept: LAB | Facility: OTHER | Age: 86
End: 2021-10-12
Payer: MEDICARE

## 2021-10-12 DIAGNOSIS — Z20.822 ENCOUNTER FOR LABORATORY TESTING FOR COVID-19 VIRUS: ICD-10-CM

## 2021-10-12 PROCEDURE — U0003 INFECTIOUS AGENT DETECTION BY NUCLEIC ACID (DNA OR RNA); SEVERE ACUTE RESPIRATORY SYNDROME CORONAVIRUS 2 (SARS-COV-2) (CORONAVIRUS DISEASE [COVID-19]), AMPLIFIED PROBE TECHNIQUE, MAKING USE OF HIGH THROUGHPUT TECHNOLOGIES AS DESCRIBED BY CMS-2020-01-R: HCPCS | Performed by: NURSE PRACTITIONER

## 2021-10-13 LAB
SARS-COV-2 RNA RESP QL NAA+PROBE: NOT DETECTED
SARS-COV-2- CYCLE NUMBER: NORMAL

## 2021-10-19 ENCOUNTER — LAB VISIT (OUTPATIENT)
Dept: LAB | Facility: OTHER | Age: 86
End: 2021-10-19
Payer: MEDICARE

## 2021-10-19 DIAGNOSIS — Z20.822 ENCOUNTER FOR LABORATORY TESTING FOR COVID-19 VIRUS: ICD-10-CM

## 2021-10-19 PROCEDURE — U0003 INFECTIOUS AGENT DETECTION BY NUCLEIC ACID (DNA OR RNA); SEVERE ACUTE RESPIRATORY SYNDROME CORONAVIRUS 2 (SARS-COV-2) (CORONAVIRUS DISEASE [COVID-19]), AMPLIFIED PROBE TECHNIQUE, MAKING USE OF HIGH THROUGHPUT TECHNOLOGIES AS DESCRIBED BY CMS-2020-01-R: HCPCS | Performed by: NURSE PRACTITIONER

## 2021-10-20 LAB
SARS-COV-2 RNA RESP QL NAA+PROBE: NOT DETECTED
SARS-COV-2- CYCLE NUMBER: NORMAL

## 2021-10-26 ENCOUNTER — LAB VISIT (OUTPATIENT)
Dept: LAB | Facility: OTHER | Age: 86
End: 2021-10-26
Payer: MEDICARE

## 2021-10-26 DIAGNOSIS — Z20.822 ENCOUNTER FOR LABORATORY TESTING FOR COVID-19 VIRUS: ICD-10-CM

## 2021-10-26 PROCEDURE — U0003 INFECTIOUS AGENT DETECTION BY NUCLEIC ACID (DNA OR RNA); SEVERE ACUTE RESPIRATORY SYNDROME CORONAVIRUS 2 (SARS-COV-2) (CORONAVIRUS DISEASE [COVID-19]), AMPLIFIED PROBE TECHNIQUE, MAKING USE OF HIGH THROUGHPUT TECHNOLOGIES AS DESCRIBED BY CMS-2020-01-R: HCPCS | Performed by: NURSE PRACTITIONER

## 2021-10-27 LAB
SARS-COV-2 RNA RESP QL NAA+PROBE: NOT DETECTED
SARS-COV-2- CYCLE NUMBER: NORMAL

## 2021-11-02 ENCOUNTER — LAB VISIT (OUTPATIENT)
Dept: LAB | Facility: OTHER | Age: 86
End: 2021-11-02
Payer: MEDICARE

## 2021-11-02 DIAGNOSIS — Z20.822 ENCOUNTER FOR LABORATORY TESTING FOR COVID-19 VIRUS: ICD-10-CM

## 2021-11-02 PROCEDURE — U0003 INFECTIOUS AGENT DETECTION BY NUCLEIC ACID (DNA OR RNA); SEVERE ACUTE RESPIRATORY SYNDROME CORONAVIRUS 2 (SARS-COV-2) (CORONAVIRUS DISEASE [COVID-19]), AMPLIFIED PROBE TECHNIQUE, MAKING USE OF HIGH THROUGHPUT TECHNOLOGIES AS DESCRIBED BY CMS-2020-01-R: HCPCS | Performed by: NURSE PRACTITIONER

## 2021-11-03 LAB
SARS-COV-2 RNA RESP QL NAA+PROBE: NOT DETECTED
SARS-COV-2- CYCLE NUMBER: NORMAL

## 2021-12-21 ENCOUNTER — LAB VISIT (OUTPATIENT)
Dept: LAB | Facility: OTHER | Age: 86
End: 2021-12-21
Payer: MEDICARE

## 2021-12-21 DIAGNOSIS — Z20.822 ENCOUNTER FOR LABORATORY TESTING FOR COVID-19 VIRUS: ICD-10-CM

## 2021-12-21 LAB
SARS-COV-2 RNA RESP QL NAA+PROBE: NOT DETECTED
SARS-COV-2- CYCLE NUMBER: NORMAL

## 2021-12-21 PROCEDURE — U0003 INFECTIOUS AGENT DETECTION BY NUCLEIC ACID (DNA OR RNA); SEVERE ACUTE RESPIRATORY SYNDROME CORONAVIRUS 2 (SARS-COV-2) (CORONAVIRUS DISEASE [COVID-19]), AMPLIFIED PROBE TECHNIQUE, MAKING USE OF HIGH THROUGHPUT TECHNOLOGIES AS DESCRIBED BY CMS-2020-01-R: HCPCS | Performed by: NURSE PRACTITIONER

## 2021-12-28 DIAGNOSIS — Z20.822 ENCOUNTER FOR LABORATORY TESTING FOR COVID-19 VIRUS: ICD-10-CM

## 2021-12-30 ENCOUNTER — HOSPITAL ENCOUNTER (INPATIENT)
Facility: HOSPITAL | Age: 86
LOS: 5 days | DRG: 208 | End: 2022-01-04
Attending: EMERGENCY MEDICINE | Admitting: FAMILY MEDICINE
Payer: MEDICARE

## 2021-12-30 DIAGNOSIS — Z01.818 ENCOUNTER FOR INTUBATION: ICD-10-CM

## 2021-12-30 DIAGNOSIS — R09.02 HYPOXIA: ICD-10-CM

## 2021-12-30 DIAGNOSIS — Z45.2 ENCOUNTER FOR CENTRAL LINE PLACEMENT: ICD-10-CM

## 2021-12-30 DIAGNOSIS — I95.9 HYPOTENSION: ICD-10-CM

## 2021-12-30 DIAGNOSIS — J81.0 ACUTE PULMONARY EDEMA: Primary | ICD-10-CM

## 2021-12-30 DIAGNOSIS — R06.02 SOB (SHORTNESS OF BREATH): ICD-10-CM

## 2021-12-30 PROBLEM — Z97.8 ENDOTRACHEALLY INTUBATED: Status: ACTIVE | Noted: 2021-12-30

## 2021-12-30 LAB
ALBUMIN SERPL BCP-MCNC: 3.8 G/DL (ref 3.5–5.2)
ALLENS TEST: ABNORMAL
ALP SERPL-CCNC: 62 U/L (ref 55–135)
ALT SERPL W/O P-5'-P-CCNC: 23 U/L (ref 10–44)
ANION GAP SERPL CALC-SCNC: 12 MMOL/L (ref 8–16)
AST SERPL-CCNC: 24 U/L (ref 10–40)
BACTERIA #/AREA URNS HPF: ABNORMAL /HPF
BASOPHILS # BLD AUTO: 0.11 K/UL (ref 0–0.2)
BASOPHILS NFR BLD: 0.7 % (ref 0–1.9)
BILIRUB SERPL-MCNC: 0.4 MG/DL (ref 0.1–1)
BILIRUB UR QL STRIP: NEGATIVE
BNP SERPL-MCNC: 748 PG/ML (ref 0–99)
BUN SERPL-MCNC: 35 MG/DL (ref 8–23)
CALCIUM SERPL-MCNC: 9.7 MG/DL (ref 8.7–10.5)
CHLORIDE SERPL-SCNC: 98 MMOL/L (ref 95–110)
CLARITY UR: ABNORMAL
CO2 SERPL-SCNC: 19 MMOL/L (ref 23–29)
COLOR UR: YELLOW
CREAT SERPL-MCNC: 1.9 MG/DL (ref 0.5–1.4)
CTP QC/QA: YES
DELSYS: ABNORMAL
DIFFERENTIAL METHOD: ABNORMAL
EOSINOPHIL # BLD AUTO: 0.1 K/UL (ref 0–0.5)
EOSINOPHIL NFR BLD: 0.6 % (ref 0–8)
ERYTHROCYTE [DISTWIDTH] IN BLOOD BY AUTOMATED COUNT: 15.5 % (ref 11.5–14.5)
ERYTHROCYTE [SEDIMENTATION RATE] IN BLOOD BY WESTERGREN METHOD: 16 MM/H
EST. GFR  (AFRICAN AMERICAN): 27 ML/MIN/1.73 M^2
EST. GFR  (NON AFRICAN AMERICAN): 24 ML/MIN/1.73 M^2
FIO2: 100
GLUCOSE SERPL-MCNC: 205 MG/DL (ref 70–110)
GLUCOSE UR QL STRIP: NEGATIVE
HCO3 UR-SCNC: 23.4 MMOL/L (ref 24–28)
HCT VFR BLD AUTO: 34.9 % (ref 37–48.5)
HGB BLD-MCNC: 11.3 G/DL (ref 12–16)
HGB UR QL STRIP: NEGATIVE
HYALINE CASTS #/AREA URNS LPF: 34 /LPF
IMM GRANULOCYTES # BLD AUTO: 0.39 K/UL (ref 0–0.04)
IMM GRANULOCYTES NFR BLD AUTO: 2.3 % (ref 0–0.5)
KETONES UR QL STRIP: NEGATIVE
LEUKOCYTE ESTERASE UR QL STRIP: ABNORMAL
LIPASE SERPL-CCNC: 28 U/L (ref 4–60)
LYMPHOCYTES # BLD AUTO: 4.4 K/UL (ref 1–4.8)
LYMPHOCYTES NFR BLD: 26.3 % (ref 18–48)
MAGNESIUM SERPL-MCNC: 2.2 MG/DL (ref 1.6–2.6)
MCH RBC QN AUTO: 32.1 PG (ref 27–31)
MCHC RBC AUTO-ENTMCNC: 32.4 G/DL (ref 32–36)
MCV RBC AUTO: 99 FL (ref 82–98)
MICROSCOPIC COMMENT: ABNORMAL
MODE: ABNORMAL
MONOCYTES # BLD AUTO: 1 K/UL (ref 0.3–1)
MONOCYTES NFR BLD: 6.1 % (ref 4–15)
NEUTROPHILS # BLD AUTO: 10.7 K/UL (ref 1.8–7.7)
NEUTROPHILS NFR BLD: 64 % (ref 38–73)
NITRITE UR QL STRIP: NEGATIVE
NRBC BLD-RTO: 0 /100 WBC
PCO2 BLDA: 40.3 MMHG (ref 35–45)
PEEP: 5
PH SMN: 7.37 [PH] (ref 7.35–7.45)
PH UR STRIP: 5 [PH] (ref 5–8)
PLATELET # BLD AUTO: 345 K/UL (ref 150–450)
PMV BLD AUTO: 9.6 FL (ref 9.2–12.9)
PO2 BLDA: 248 MMHG (ref 80–100)
POC BE: -2 MMOL/L
POC SATURATED O2: 100 % (ref 95–100)
POC TCO2: 25 MMOL/L (ref 23–27)
POTASSIUM SERPL-SCNC: 5.6 MMOL/L (ref 3.5–5.1)
PROT SERPL-MCNC: 8.1 G/DL (ref 6–8.4)
PROT UR QL STRIP: ABNORMAL
RBC # BLD AUTO: 3.52 M/UL (ref 4–5.4)
RBC #/AREA URNS HPF: 3 /HPF (ref 0–4)
SAMPLE: ABNORMAL
SARS-COV-2 RDRP RESP QL NAA+PROBE: NEGATIVE
SITE: ABNORMAL
SODIUM SERPL-SCNC: 129 MMOL/L (ref 136–145)
SP GR UR STRIP: 1.01 (ref 1–1.03)
TROPONIN I SERPL DL<=0.01 NG/ML-MCNC: 0.04 NG/ML (ref 0–0.03)
URN SPEC COLLECT METH UR: ABNORMAL
UROBILINOGEN UR STRIP-ACNC: NEGATIVE EU/DL
VT: 400
WBC # BLD AUTO: 16.68 K/UL (ref 3.9–12.7)
WBC #/AREA URNS HPF: 32 /HPF (ref 0–5)
WBC CLUMPS URNS QL MICRO: ABNORMAL

## 2021-12-30 PROCEDURE — 85025 COMPLETE CBC W/AUTO DIFF WBC: CPT | Performed by: EMERGENCY MEDICINE

## 2021-12-30 PROCEDURE — 87186 SC STD MICRODIL/AGAR DIL: CPT | Performed by: EMERGENCY MEDICINE

## 2021-12-30 PROCEDURE — 96374 THER/PROPH/DIAG INJ IV PUSH: CPT

## 2021-12-30 PROCEDURE — 27000207 HC ISOLATION

## 2021-12-30 PROCEDURE — 94002 VENT MGMT INPAT INIT DAY: CPT

## 2021-12-30 PROCEDURE — 99900035 HC TECH TIME PER 15 MIN (STAT)

## 2021-12-30 PROCEDURE — 87040 BLOOD CULTURE FOR BACTERIA: CPT | Performed by: EMERGENCY MEDICINE

## 2021-12-30 PROCEDURE — 25000003 PHARM REV CODE 250: Performed by: EMERGENCY MEDICINE

## 2021-12-30 PROCEDURE — 81000 URINALYSIS NONAUTO W/SCOPE: CPT | Performed by: EMERGENCY MEDICINE

## 2021-12-30 PROCEDURE — 87088 URINE BACTERIA CULTURE: CPT | Performed by: EMERGENCY MEDICINE

## 2021-12-30 PROCEDURE — 94761 N-INVAS EAR/PLS OXIMETRY MLT: CPT

## 2021-12-30 PROCEDURE — 83735 ASSAY OF MAGNESIUM: CPT | Performed by: EMERGENCY MEDICINE

## 2021-12-30 PROCEDURE — 80053 COMPREHEN METABOLIC PANEL: CPT | Performed by: EMERGENCY MEDICINE

## 2021-12-30 PROCEDURE — 94644 CONT INHLJ TX 1ST HOUR: CPT

## 2021-12-30 PROCEDURE — 94660 CPAP INITIATION&MGMT: CPT

## 2021-12-30 PROCEDURE — 63600175 PHARM REV CODE 636 W HCPCS: Performed by: EMERGENCY MEDICINE

## 2021-12-30 PROCEDURE — 87086 URINE CULTURE/COLONY COUNT: CPT | Performed by: EMERGENCY MEDICINE

## 2021-12-30 PROCEDURE — 84484 ASSAY OF TROPONIN QUANT: CPT | Performed by: EMERGENCY MEDICINE

## 2021-12-30 PROCEDURE — U0002 COVID-19 LAB TEST NON-CDC: HCPCS | Performed by: EMERGENCY MEDICINE

## 2021-12-30 PROCEDURE — 36556 INSERT NON-TUNNEL CV CATH: CPT

## 2021-12-30 PROCEDURE — 12000002 HC ACUTE/MED SURGE SEMI-PRIVATE ROOM

## 2021-12-30 PROCEDURE — 27000190 HC CPAP FULL FACE MASK W/VALVE

## 2021-12-30 PROCEDURE — 94640 AIRWAY INHALATION TREATMENT: CPT

## 2021-12-30 PROCEDURE — 83690 ASSAY OF LIPASE: CPT | Performed by: EMERGENCY MEDICINE

## 2021-12-30 PROCEDURE — 96375 TX/PRO/DX INJ NEW DRUG ADDON: CPT

## 2021-12-30 PROCEDURE — 87077 CULTURE AEROBIC IDENTIFY: CPT | Performed by: EMERGENCY MEDICINE

## 2021-12-30 PROCEDURE — 25000242 PHARM REV CODE 250 ALT 637 W/ HCPCS: Performed by: EMERGENCY MEDICINE

## 2021-12-30 PROCEDURE — 31500 INSERT EMERGENCY AIRWAY: CPT

## 2021-12-30 PROCEDURE — 99291 CRITICAL CARE FIRST HOUR: CPT | Mod: 25

## 2021-12-30 PROCEDURE — 83880 ASSAY OF NATRIURETIC PEPTIDE: CPT | Performed by: EMERGENCY MEDICINE

## 2021-12-30 RX ORDER — NOREPINEPHRINE BITARTRATE/D5W 4MG/250ML
0-3 PLASTIC BAG, INJECTION (ML) INTRAVENOUS CONTINUOUS
Status: DISCONTINUED | OUTPATIENT
Start: 2021-12-30 | End: 2021-12-31

## 2021-12-30 RX ORDER — POTASSIUM CHLORIDE 14.9 MG/ML
40 INJECTION INTRAVENOUS
Status: DISCONTINUED | OUTPATIENT
Start: 2021-12-31 | End: 2022-01-01

## 2021-12-30 RX ORDER — SUCCINYLCHOLINE CHLORIDE 20 MG/ML
110 INJECTION INTRAMUSCULAR; INTRAVENOUS
Status: COMPLETED | OUTPATIENT
Start: 2021-12-30 | End: 2021-12-30

## 2021-12-30 RX ORDER — INSULIN ASPART 100 [IU]/ML
0-5 INJECTION, SOLUTION INTRAVENOUS; SUBCUTANEOUS EVERY 6 HOURS PRN
Status: DISCONTINUED | OUTPATIENT
Start: 2021-12-31 | End: 2021-12-31

## 2021-12-30 RX ORDER — FENTANYL CITRATE 50 UG/ML
100 INJECTION, SOLUTION INTRAMUSCULAR; INTRAVENOUS
Status: COMPLETED | OUTPATIENT
Start: 2021-12-30 | End: 2021-12-30

## 2021-12-30 RX ORDER — ATORVASTATIN CALCIUM 10 MG/1
20 TABLET, FILM COATED ORAL DAILY
Status: DISCONTINUED | OUTPATIENT
Start: 2021-12-31 | End: 2022-01-04 | Stop reason: HOSPADM

## 2021-12-30 RX ORDER — MAGNESIUM SULFATE HEPTAHYDRATE 40 MG/ML
2 INJECTION, SOLUTION INTRAVENOUS
Status: DISCONTINUED | OUTPATIENT
Start: 2021-12-31 | End: 2022-01-01

## 2021-12-30 RX ORDER — FUROSEMIDE 10 MG/ML
40 INJECTION INTRAMUSCULAR; INTRAVENOUS
Status: DISCONTINUED | OUTPATIENT
Start: 2021-12-31 | End: 2022-01-01

## 2021-12-30 RX ORDER — ONDANSETRON 2 MG/ML
8 INJECTION INTRAMUSCULAR; INTRAVENOUS
Status: COMPLETED | OUTPATIENT
Start: 2021-12-30 | End: 2021-12-30

## 2021-12-30 RX ORDER — ONDANSETRON 2 MG/ML
4 INJECTION INTRAMUSCULAR; INTRAVENOUS EVERY 8 HOURS PRN
Status: DISCONTINUED | OUTPATIENT
Start: 2021-12-31 | End: 2022-01-04 | Stop reason: HOSPADM

## 2021-12-30 RX ORDER — PROPOFOL 10 MG/ML
20 INJECTION, EMULSION INTRAVENOUS
Status: COMPLETED | OUTPATIENT
Start: 2021-12-30 | End: 2021-12-30

## 2021-12-30 RX ORDER — DOPAMINE HYDROCHLORIDE 160 MG/100ML
10 INJECTION, SOLUTION INTRAVENOUS CONTINUOUS
Status: DISCONTINUED | OUTPATIENT
Start: 2021-12-30 | End: 2021-12-31

## 2021-12-30 RX ORDER — IPRATROPIUM BROMIDE 0.5 MG/2.5ML
1 SOLUTION RESPIRATORY (INHALATION)
Status: COMPLETED | OUTPATIENT
Start: 2021-12-30 | End: 2021-12-30

## 2021-12-30 RX ORDER — EXEMESTANE 25 MG/1
25 TABLET ORAL DAILY
Status: DISCONTINUED | OUTPATIENT
Start: 2021-12-31 | End: 2021-12-31 | Stop reason: RX

## 2021-12-30 RX ORDER — ENOXAPARIN SODIUM 100 MG/ML
30 INJECTION SUBCUTANEOUS EVERY 24 HOURS
Status: DISCONTINUED | OUTPATIENT
Start: 2021-12-31 | End: 2022-01-04 | Stop reason: HOSPADM

## 2021-12-30 RX ORDER — FAMOTIDINE 10 MG/ML
20 INJECTION INTRAVENOUS EVERY 12 HOURS
Status: DISCONTINUED | OUTPATIENT
Start: 2021-12-31 | End: 2021-12-31 | Stop reason: DRUGHIGH

## 2021-12-30 RX ORDER — GLUCAGON 1 MG
1 KIT INJECTION
Status: DISCONTINUED | OUTPATIENT
Start: 2021-12-31 | End: 2022-01-04 | Stop reason: HOSPADM

## 2021-12-30 RX ORDER — ETOMIDATE 2 MG/ML
20 INJECTION INTRAVENOUS
Status: COMPLETED | OUTPATIENT
Start: 2021-12-30 | End: 2021-12-30

## 2021-12-30 RX ORDER — ATROPINE SULFATE 0.1 MG/ML
INJECTION INTRAVENOUS
Status: DISPENSED
Start: 2021-12-30 | End: 2021-12-31

## 2021-12-30 RX ORDER — CLOPIDOGREL BISULFATE 75 MG/1
75 TABLET ORAL DAILY
Status: DISCONTINUED | OUTPATIENT
Start: 2021-12-31 | End: 2022-01-04 | Stop reason: HOSPADM

## 2021-12-30 RX ORDER — FUROSEMIDE 10 MG/ML
40 INJECTION INTRAMUSCULAR; INTRAVENOUS
Status: COMPLETED | OUTPATIENT
Start: 2021-12-30 | End: 2021-12-30

## 2021-12-30 RX ORDER — HYDRALAZINE HYDROCHLORIDE 20 MG/ML
10 INJECTION INTRAMUSCULAR; INTRAVENOUS
Status: COMPLETED | OUTPATIENT
Start: 2021-12-30 | End: 2021-12-30

## 2021-12-30 RX ORDER — POTASSIUM CHLORIDE 29.8 MG/ML
40 INJECTION INTRAVENOUS
Status: DISCONTINUED | OUTPATIENT
Start: 2021-12-31 | End: 2022-01-01

## 2021-12-30 RX ORDER — ASPIRIN 81 MG/1
81 TABLET ORAL DAILY
Status: DISCONTINUED | OUTPATIENT
Start: 2021-12-31 | End: 2022-01-04 | Stop reason: HOSPADM

## 2021-12-30 RX ORDER — ATROPINE SULFATE 0.1 MG/ML
0.5 INJECTION INTRAVENOUS ONCE
Status: DISCONTINUED | OUTPATIENT
Start: 2021-12-30 | End: 2021-12-31

## 2021-12-30 RX ORDER — SODIUM CHLORIDE 0.9 % (FLUSH) 0.9 %
10 SYRINGE (ML) INJECTION
Status: DISCONTINUED | OUTPATIENT
Start: 2021-12-30 | End: 2022-01-04 | Stop reason: HOSPADM

## 2021-12-30 RX ORDER — DEXMEDETOMIDINE HYDROCHLORIDE 4 UG/ML
0-1.4 INJECTION, SOLUTION INTRAVENOUS CONTINUOUS
Status: DISCONTINUED | OUTPATIENT
Start: 2021-12-30 | End: 2021-12-31

## 2021-12-30 RX ORDER — ALBUTEROL SULFATE 2.5 MG/.5ML
10 SOLUTION RESPIRATORY (INHALATION)
Status: COMPLETED | OUTPATIENT
Start: 2021-12-30 | End: 2021-12-30

## 2021-12-30 RX ORDER — NITROGLYCERIN 0.4 MG/1
0.4 TABLET SUBLINGUAL
Status: COMPLETED | OUTPATIENT
Start: 2021-12-30 | End: 2021-12-30

## 2021-12-30 RX ORDER — MAGNESIUM SULFATE HEPTAHYDRATE 40 MG/ML
4 INJECTION, SOLUTION INTRAVENOUS
Status: DISCONTINUED | OUTPATIENT
Start: 2021-12-31 | End: 2022-01-01

## 2021-12-30 RX ORDER — MIDAZOLAM HYDROCHLORIDE 1 MG/ML
4 INJECTION INTRAMUSCULAR; INTRAVENOUS
Status: COMPLETED | OUTPATIENT
Start: 2021-12-30 | End: 2021-12-30

## 2021-12-30 RX ADMIN — FUROSEMIDE 40 MG: 10 INJECTION, SOLUTION INTRAMUSCULAR; INTRAVENOUS at 07:12

## 2021-12-30 RX ADMIN — MIDAZOLAM 4 MG: 1 INJECTION INTRAMUSCULAR; INTRAVENOUS at 08:12

## 2021-12-30 RX ADMIN — ETOMIDATE 20 MG: 2 INJECTION INTRAVENOUS at 08:12

## 2021-12-30 RX ADMIN — NITROGLYCERIN 0.4 MG: 0.4 TABLET, ORALLY DISINTEGRATING SUBLINGUAL at 07:12

## 2021-12-30 RX ADMIN — DOPAMINE HYDROCHLORIDE IN DEXTROSE 10 MCG/KG/MIN: 1.6 INJECTION, SOLUTION INTRAVENOUS at 09:12

## 2021-12-30 RX ADMIN — IPRATROPIUM BROMIDE 1 MG: 0.5 SOLUTION RESPIRATORY (INHALATION) at 07:12

## 2021-12-30 RX ADMIN — SUCCINYLCHOLINE CHLORIDE 110 MG: 20 INJECTION, SOLUTION INTRAMUSCULAR; INTRAVENOUS; PARENTERAL at 08:12

## 2021-12-30 RX ADMIN — DEXMEDETOMIDINE HYDROCHLORIDE IN 0.9% SODIUM CHLORIDE 0.3 MCG/KG/HR: 4 INJECTION INTRAVENOUS at 10:12

## 2021-12-30 RX ADMIN — AZITHROMYCIN MONOHYDRATE 500 MG: 500 INJECTION, POWDER, LYOPHILIZED, FOR SOLUTION INTRAVENOUS at 10:12

## 2021-12-30 RX ADMIN — NITROGLYCERIN 1 INCH: 20 OINTMENT TOPICAL at 07:12

## 2021-12-30 RX ADMIN — FENTANYL CITRATE 100 MCG: 50 INJECTION, SOLUTION INTRAMUSCULAR; INTRAVENOUS at 08:12

## 2021-12-30 RX ADMIN — HYDRALAZINE HYDROCHLORIDE 10 MG: 20 INJECTION INTRAMUSCULAR; INTRAVENOUS at 07:12

## 2021-12-30 RX ADMIN — CEFTRIAXONE 2 G: 2 INJECTION, SOLUTION INTRAVENOUS at 10:12

## 2021-12-30 RX ADMIN — ONDANSETRON 8 MG: 2 INJECTION INTRAMUSCULAR; INTRAVENOUS at 07:12

## 2021-12-30 RX ADMIN — ALBUTEROL SULFATE 10 MG: 2.5 SOLUTION RESPIRATORY (INHALATION) at 07:12

## 2021-12-30 RX ADMIN — PROPOFOL 20 MCG/KG/MIN: 10 INJECTION, EMULSION INTRAVENOUS at 10:12

## 2021-12-31 PROBLEM — N17.9 AKI (ACUTE KIDNEY INJURY): Status: ACTIVE | Noted: 2021-12-31

## 2021-12-31 PROBLEM — J96.01 ACUTE HYPOXEMIC RESPIRATORY FAILURE: Status: ACTIVE | Noted: 2021-12-30

## 2021-12-31 PROBLEM — I16.1 HYPERTENSIVE EMERGENCY: Status: ACTIVE | Noted: 2021-12-31

## 2021-12-31 PROBLEM — E83.51 HYPOCALCEMIA: Status: RESOLVED | Noted: 2020-01-24 | Resolved: 2021-12-31

## 2021-12-31 PROBLEM — E83.42 HYPOMAGNESEMIA: Status: RESOLVED | Noted: 2020-01-24 | Resolved: 2021-12-31

## 2021-12-31 PROBLEM — R21 RASH, SKIN: Status: RESOLVED | Noted: 2017-09-11 | Resolved: 2021-12-31

## 2021-12-31 PROBLEM — Z79.899 CHRONIC PRESCRIPTION BENZODIAZEPINE USE: Status: ACTIVE | Noted: 2021-12-31

## 2021-12-31 PROBLEM — M89.9 BONE LESION: Status: ACTIVE | Noted: 2021-12-31

## 2021-12-31 PROBLEM — N39.0 UTI (URINARY TRACT INFECTION): Status: ACTIVE | Noted: 2021-12-31

## 2021-12-31 PROBLEM — D64.9 NORMOCYTIC ANEMIA: Status: ACTIVE | Noted: 2021-12-31

## 2021-12-31 PROBLEM — G58.9 PINCHED NERVE IN NECK: Status: RESOLVED | Noted: 2017-10-18 | Resolved: 2021-12-31

## 2021-12-31 PROBLEM — E87.1 HYPONATREMIA: Status: ACTIVE | Noted: 2021-12-31

## 2021-12-31 PROBLEM — R06.02 SOB (SHORTNESS OF BREATH): Status: RESOLVED | Noted: 2021-12-30 | Resolved: 2021-12-31

## 2021-12-31 PROBLEM — J18.9 LEFT LOWER LOBE PNEUMONIA: Status: ACTIVE | Noted: 2021-12-31

## 2021-12-31 LAB
ALBUMIN SERPL BCP-MCNC: 3.6 G/DL (ref 3.5–5.2)
ALP SERPL-CCNC: 55 U/L (ref 55–135)
ALT SERPL W/O P-5'-P-CCNC: 21 U/L (ref 10–44)
ANION GAP SERPL CALC-SCNC: 12 MMOL/L (ref 8–16)
ANION GAP SERPL CALC-SCNC: 13 MMOL/L (ref 8–16)
AST SERPL-CCNC: 24 U/L (ref 10–40)
BASOPHILS # BLD AUTO: 0.02 K/UL (ref 0–0.2)
BASOPHILS NFR BLD: 0.1 % (ref 0–1.9)
BILIRUB SERPL-MCNC: 0.4 MG/DL (ref 0.1–1)
BUN SERPL-MCNC: 37 MG/DL (ref 8–23)
BUN SERPL-MCNC: 37 MG/DL (ref 8–23)
CALCIUM SERPL-MCNC: 9.3 MG/DL (ref 8.7–10.5)
CALCIUM SERPL-MCNC: 9.3 MG/DL (ref 8.7–10.5)
CHLORIDE SERPL-SCNC: 94 MMOL/L (ref 95–110)
CHLORIDE SERPL-SCNC: 94 MMOL/L (ref 95–110)
CO2 SERPL-SCNC: 22 MMOL/L (ref 23–29)
CO2 SERPL-SCNC: 23 MMOL/L (ref 23–29)
CREAT SERPL-MCNC: 1.8 MG/DL (ref 0.5–1.4)
CREAT SERPL-MCNC: 1.9 MG/DL (ref 0.5–1.4)
DIFFERENTIAL METHOD: ABNORMAL
EOSINOPHIL # BLD AUTO: 0 K/UL (ref 0–0.5)
EOSINOPHIL NFR BLD: 0 % (ref 0–8)
ERYTHROCYTE [DISTWIDTH] IN BLOOD BY AUTOMATED COUNT: 14.9 % (ref 11.5–14.5)
EST. GFR  (AFRICAN AMERICAN): 27 ML/MIN/1.73 M^2
EST. GFR  (AFRICAN AMERICAN): 29 ML/MIN/1.73 M^2
EST. GFR  (NON AFRICAN AMERICAN): 24 ML/MIN/1.73 M^2
EST. GFR  (NON AFRICAN AMERICAN): 25 ML/MIN/1.73 M^2
ESTIMATED AVG GLUCOSE: 123 MG/DL (ref 68–131)
GLUCOSE SERPL-MCNC: 229 MG/DL (ref 70–110)
GLUCOSE SERPL-MCNC: 230 MG/DL (ref 70–110)
HBA1C MFR BLD: 5.9 % (ref 4–5.6)
HCT VFR BLD AUTO: 32.5 % (ref 37–48.5)
HGB BLD-MCNC: 10.7 G/DL (ref 12–16)
IMM GRANULOCYTES # BLD AUTO: 0.19 K/UL (ref 0–0.04)
IMM GRANULOCYTES NFR BLD AUTO: 1.1 % (ref 0–0.5)
LYMPHOCYTES # BLD AUTO: 0.6 K/UL (ref 1–4.8)
LYMPHOCYTES NFR BLD: 3.6 % (ref 18–48)
MAGNESIUM SERPL-MCNC: 1.9 MG/DL (ref 1.6–2.6)
MCH RBC QN AUTO: 31.6 PG (ref 27–31)
MCHC RBC AUTO-ENTMCNC: 32.9 G/DL (ref 32–36)
MCV RBC AUTO: 96 FL (ref 82–98)
MONOCYTES # BLD AUTO: 0.7 K/UL (ref 0.3–1)
MONOCYTES NFR BLD: 4.1 % (ref 4–15)
NEUTROPHILS # BLD AUTO: 15.9 K/UL (ref 1.8–7.7)
NEUTROPHILS NFR BLD: 91.1 % (ref 38–73)
NRBC BLD-RTO: 0 /100 WBC
PHOSPHATE SERPL-MCNC: 3.7 MG/DL (ref 2.7–4.5)
PLATELET # BLD AUTO: 302 K/UL (ref 150–450)
PMV BLD AUTO: 9.1 FL (ref 9.2–12.9)
POCT GLUCOSE: 122 MG/DL (ref 70–110)
POCT GLUCOSE: 142 MG/DL (ref 70–110)
POCT GLUCOSE: 144 MG/DL (ref 70–110)
POCT GLUCOSE: 185 MG/DL (ref 70–110)
POCT GLUCOSE: 375 MG/DL (ref 70–110)
POTASSIUM SERPL-SCNC: 4.8 MMOL/L (ref 3.5–5.1)
POTASSIUM SERPL-SCNC: 4.8 MMOL/L (ref 3.5–5.1)
PROCALCITONIN SERPL IA-MCNC: 0.15 NG/ML
PROT SERPL-MCNC: 7.6 G/DL (ref 6–8.4)
RBC # BLD AUTO: 3.39 M/UL (ref 4–5.4)
SODIUM SERPL-SCNC: 129 MMOL/L (ref 136–145)
SODIUM SERPL-SCNC: 129 MMOL/L (ref 136–145)
TROPONIN I SERPL DL<=0.01 NG/ML-MCNC: 0.08 NG/ML (ref 0–0.03)
TROPONIN I SERPL DL<=0.01 NG/ML-MCNC: 0.1 NG/ML (ref 0–0.03)
WBC # BLD AUTO: 17.43 K/UL (ref 3.9–12.7)

## 2021-12-31 PROCEDURE — 27000249 HC VAPOTHERM CIRCUIT

## 2021-12-31 PROCEDURE — 20000000 HC ICU ROOM

## 2021-12-31 PROCEDURE — 84100 ASSAY OF PHOSPHORUS: CPT | Performed by: FAMILY MEDICINE

## 2021-12-31 PROCEDURE — 99900026 HC AIRWAY MAINTENANCE (STAT)

## 2021-12-31 PROCEDURE — 99291 PR CRITICAL CARE, E/M 30-74 MINUTES: ICD-10-PCS | Mod: 95,GC,, | Performed by: INTERNAL MEDICINE

## 2021-12-31 PROCEDURE — 99900035 HC TECH TIME PER 15 MIN (STAT)

## 2021-12-31 PROCEDURE — 83036 HEMOGLOBIN GLYCOSYLATED A1C: CPT | Performed by: FAMILY MEDICINE

## 2021-12-31 PROCEDURE — 99291 CRITICAL CARE FIRST HOUR: CPT | Mod: ,,, | Performed by: NURSE PRACTITIONER

## 2021-12-31 PROCEDURE — 80053 COMPREHEN METABOLIC PANEL: CPT | Performed by: FAMILY MEDICINE

## 2021-12-31 PROCEDURE — 63600175 PHARM REV CODE 636 W HCPCS: Performed by: FAMILY MEDICINE

## 2021-12-31 PROCEDURE — 85025 COMPLETE CBC W/AUTO DIFF WBC: CPT | Performed by: FAMILY MEDICINE

## 2021-12-31 PROCEDURE — 93010 ELECTROCARDIOGRAM REPORT: CPT | Mod: ,,, | Performed by: INTERNAL MEDICINE

## 2021-12-31 PROCEDURE — 99291 CRITICAL CARE FIRST HOUR: CPT | Mod: 95,GC,, | Performed by: INTERNAL MEDICINE

## 2021-12-31 PROCEDURE — 27100171 HC OXYGEN HIGH FLOW UP TO 24 HOURS

## 2021-12-31 PROCEDURE — 80048 BASIC METABOLIC PNL TOTAL CA: CPT | Performed by: FAMILY MEDICINE

## 2021-12-31 PROCEDURE — 25000003 PHARM REV CODE 250: Performed by: FAMILY MEDICINE

## 2021-12-31 PROCEDURE — 84145 PROCALCITONIN (PCT): CPT | Performed by: FAMILY MEDICINE

## 2021-12-31 PROCEDURE — 83735 ASSAY OF MAGNESIUM: CPT | Performed by: FAMILY MEDICINE

## 2021-12-31 PROCEDURE — 84484 ASSAY OF TROPONIN QUANT: CPT | Performed by: FAMILY MEDICINE

## 2021-12-31 PROCEDURE — 84484 ASSAY OF TROPONIN QUANT: CPT | Mod: 91 | Performed by: HOSPITALIST

## 2021-12-31 PROCEDURE — 94761 N-INVAS EAR/PLS OXIMETRY MLT: CPT

## 2021-12-31 PROCEDURE — 63600175 PHARM REV CODE 636 W HCPCS: Performed by: INTERNAL MEDICINE

## 2021-12-31 PROCEDURE — 87070 CULTURE OTHR SPECIMN AEROBIC: CPT | Performed by: HOSPITALIST

## 2021-12-31 PROCEDURE — 63600175 PHARM REV CODE 636 W HCPCS: Performed by: HOSPITALIST

## 2021-12-31 PROCEDURE — 93010 EKG 12-LEAD: ICD-10-PCS | Mod: ,,, | Performed by: INTERNAL MEDICINE

## 2021-12-31 PROCEDURE — 25000003 PHARM REV CODE 250: Performed by: HOSPITALIST

## 2021-12-31 PROCEDURE — 94799 UNLISTED PULMONARY SVC/PX: CPT

## 2021-12-31 PROCEDURE — 87205 SMEAR GRAM STAIN: CPT | Performed by: HOSPITALIST

## 2021-12-31 PROCEDURE — 93005 ELECTROCARDIOGRAM TRACING: CPT

## 2021-12-31 PROCEDURE — 99291 PR CRITICAL CARE, E/M 30-74 MINUTES: ICD-10-PCS | Mod: ,,, | Performed by: NURSE PRACTITIONER

## 2021-12-31 RX ORDER — PROPOFOL 10 MG/ML
0-50 INJECTION, EMULSION INTRAVENOUS CONTINUOUS
Status: DISCONTINUED | OUTPATIENT
Start: 2021-12-31 | End: 2021-12-31

## 2021-12-31 RX ORDER — ACETAMINOPHEN 325 MG/1
650 TABLET ORAL EVERY 6 HOURS PRN
Status: DISCONTINUED | OUTPATIENT
Start: 2021-12-31 | End: 2022-01-04 | Stop reason: HOSPADM

## 2021-12-31 RX ORDER — FAMOTIDINE 10 MG/ML
20 INJECTION INTRAVENOUS DAILY
Status: DISCONTINUED | OUTPATIENT
Start: 2021-12-31 | End: 2022-01-01

## 2021-12-31 RX ORDER — FENTANYL CITRATE 50 UG/ML
25 INJECTION, SOLUTION INTRAMUSCULAR; INTRAVENOUS
Status: DISCONTINUED | OUTPATIENT
Start: 2021-12-31 | End: 2021-12-31

## 2021-12-31 RX ORDER — INSULIN ASPART 100 [IU]/ML
0-5 INJECTION, SOLUTION INTRAVENOUS; SUBCUTANEOUS
Status: DISCONTINUED | OUTPATIENT
Start: 2021-12-31 | End: 2022-01-04 | Stop reason: HOSPADM

## 2021-12-31 RX ORDER — LORAZEPAM 0.5 MG/1
0.5 TABLET ORAL EVERY 12 HOURS PRN
Status: DISCONTINUED | OUTPATIENT
Start: 2021-12-31 | End: 2022-01-02

## 2021-12-31 RX ADMIN — FUROSEMIDE 40 MG: 10 INJECTION, SOLUTION INTRAMUSCULAR; INTRAVENOUS at 12:12

## 2021-12-31 RX ADMIN — INSULIN ASPART 3 UNITS: 100 INJECTION, SOLUTION INTRAVENOUS; SUBCUTANEOUS at 01:12

## 2021-12-31 RX ADMIN — PROPOFOL 10 MCG/KG/MIN: 10 INJECTION, EMULSION INTRAVENOUS at 02:12

## 2021-12-31 RX ADMIN — AZITHROMYCIN MONOHYDRATE 500 MG: 500 INJECTION, POWDER, LYOPHILIZED, FOR SOLUTION INTRAVENOUS at 09:12

## 2021-12-31 RX ADMIN — ENOXAPARIN SODIUM 30 MG: 30 INJECTION, SOLUTION INTRAVENOUS; SUBCUTANEOUS at 04:12

## 2021-12-31 RX ADMIN — Medication 0.02 MCG/KG/MIN: at 02:12

## 2021-12-31 RX ADMIN — LORAZEPAM 0.5 MG: 0.5 TABLET ORAL at 02:12

## 2021-12-31 RX ADMIN — CLOPIDOGREL 75 MG: 75 TABLET, FILM COATED ORAL at 08:12

## 2021-12-31 RX ADMIN — FAMOTIDINE 20 MG: 10 INJECTION INTRAVENOUS at 08:12

## 2021-12-31 RX ADMIN — ACETAMINOPHEN 650 MG: 325 TABLET ORAL at 05:12

## 2021-12-31 RX ADMIN — ASPIRIN 81 MG: 81 TABLET, COATED ORAL at 08:12

## 2021-12-31 RX ADMIN — ATORVASTATIN CALCIUM 20 MG: 10 TABLET, FILM COATED ORAL at 08:12

## 2021-12-31 RX ADMIN — CEFTRIAXONE 2 G: 2 INJECTION, SOLUTION INTRAVENOUS at 10:12

## 2021-12-31 NOTE — NURSING
Brief periods of bradycardia into 30s. Dr. Quiroz notified. Pt transitioned from precedex and dopamine to propofol and levo. Labs sent. Will continue to monitor.

## 2021-12-31 NOTE — ASSESSMENT & PLAN NOTE
A1c:   Lab Results   Component Value Date    LABA1C 5.6 05/31/2018    HGBA1C 6.2 (H) 07/09/2021     Meds: SSI PRN to maintain goal 140-180  ADA diet if can tolerated, accuchecks, hypoglycemic protocol

## 2021-12-31 NOTE — ASSESSMENT & PLAN NOTE
Patient admitted with shortness of breath consistent with acute on chronic diastolic CHF. Cause of exacerbation is hypertensive emergency + severe AS    Last TTE 7/2021 showed EF 60%, G1DD, severe AS   BNP  Recent Labs   Lab 12/30/21  1925   *     CXR: pulmonary edema  Recent Labs   Lab 12/31/21  0842   TROPONINI 0.095*     Start on IV lasix diuresis. Monitor ins and outs  TTE pending   Cardiology follow up on discharge

## 2021-12-31 NOTE — NURSING
"09:36 Pt extubated and NGt removed. As soon as pt was extubated she stated, " I do not want them to cut on me. I have had cancer a long time and do not want to be cut on."  Assured her that nobody can do surgery without her permission.    She is presently on  vapotherm and able to suction her self for small amount oral secretions.  13:35 Pt swallowed water with and without a straw without any sign of difficulty; dr. Locke notified.  14:35 Medicated with ativan for trembling limbs; pt reports she takes ativan twice daily at the home.    "

## 2021-12-31 NOTE — ASSESSMENT & PLAN NOTE
TTE with severe AS  Patient has declined surgery and TAVR  Suspect she will continue to have issues with CHF exacerbations due to severe AS  Palliative care consulted

## 2021-12-31 NOTE — ED TRIAGE NOTES
Pt to ED via EMS from [facility] for SOB, abdominal pain and chest pain. EMS reports possible aspiration while eating. EMS reports initial O2 65-70% on 2L NC. Pt arrives in respiratory distress on 15L NRB. AAOx3.

## 2021-12-31 NOTE — HPI
Vinita Andrews is an 86 y.o. female who presented with respiratory distress.  Hypoxia.  Blood pressure was significantly elevated in the emergency room.  Patient was intubated in the emergency room.  Blood pressure improved after intubation as well as treatment with IV meds, hydralazine.  Chest x-ray suggestive of pulmonary edema.  Question of a right-sided infiltrate.  Patient has a history of breast cancer.  History of mastectomy.  Lymphedema.  Severe aortic stenosis.  Diastolic heart failure secondary to valvular abnormality.  Creatinine was elevated on presentation.  BNP was elevated.  EKG showed sinus bradycardia.  IVCD.  Latest troponin 0.095.    Echocardiogram 7/9/21:    The left ventricle is normal in size with concentric hypertrophy and normal systolic function.  The estimated ejection fraction is 60%.  Grade I left ventricular diastolic dysfunction.  Normal right ventricular size with normal right ventricular systolic function.  Moderate left atrial enlargement.  There is severe aortic valve stenosis.  Aortic valve area is 0.77 cm2; peak velocity is 4.58 m/s; mean gradient is 51 mmHg.  Mild aortic regurgitation.  Normal central venous pressure (3 mmHg).  The estimated PA systolic pressure is 29 mmHg.  The aortic root is moderately dilated. 4.0 cm       Echo 6/28/21 [facility]   1. Estimated left ventricular ejection fraction is 55 to 60%.    2. Left atrium is severely dilated.    3. Mild to moderate aortic regurgitation.    4. Normal left ventricular systolic function.    5. Moderate to severe aortic valve stenosis.    6. Left ventricular wall thickness is mildly increased.    7. Moderate mitral stenosis.    8. Abnormal left ventricular strain (-15.0 %). -15.1 %.    9. Mild mitral valve regurgitation.   10. Mildly elevated systolic pulmonary artery pressure.   11. Moderate aortic annular calcification.   12. Right ventricular systolic function is mildly reduced.

## 2021-12-31 NOTE — ASSESSMENT & PLAN NOTE
Cr 1.8 on admit, worsening from baseline 0.9-1.2. Suspect due to HTN emergency + CHF exacerbation  - continue lasix diuresis

## 2021-12-31 NOTE — H&P
[facility] Emergency Dept  [facility]  History & Physical    Patient Name: Vinita Andrews  MRN: [id number]  Patient Class: IP- Inpatient  Admission Date: 12/30/2021  Attending Physician: Giovani Han MD   Primary Care Provider: Louis Burt MD         Patient information was obtained from ER records.     Subjective:     Principal Problem:Congestive heart failure    Chief Complaint:   Chief Complaint   Patient presents with    Shortness of Breath     Pt to ED via [facility] for severe SOB. EMS reports O2 65-70%, HR 40-50's. Pt aaox3. Pt in respiratory distress on arrival on 15L NRB at 85% per EMS. Pt diaphoretic, IV pulled out on arrival from right hand.         HPI: History from ED physician and nurse at bedside.  At time of my evaluation, patient had been intubated.    86 y.o. female with a past medical history of diabetes mellitus, breast cancer, postmastectomy lymphadema syndrome, and diastolic heart failure who presents to the Emergency Department via EMS from [facility] for evaluation of respiratory distress. Per EMS, patient's oxygen saturation was in the 65-70% range at the time of their intervention. Nursing staff reports that the patient may have aspirated while eating dinner. Patient denies any aspiration, choking, or vomiting. However, she states that she has had abdominal pain and nausea since 1300 today. She does not note any other associated symptoms.  There is concern for potential aspiration, although per ED doc patient denied any difficulty with eating today.  Patient experienced chest tightness.  Due to hypoxia and respiratory distress, patient was intubated in the emergency room and central line placed, placement was confirmed via x-ray. At time of intubation, frothy sputum was noted.  Initially patient was hypertensive at 230/100.  After hydralazine, systolic blood pressure was down to around 110. After intubation with sedation, systolic blood pressure  transiently went down to 52. Dopamine drip was started and mm AP came up to around 65.  Levophed was ordered as next pressor to be added should blood pressures dropped again.    CT of the abdomen and pelvis was ordered given complaint of abdominal pain.  Patient received dose of ceftriaxone and azithromycin in the emergency room.    Lab work significant for leukocytosis at 16 point 2, potassium 5.9, creatinine 1.9.  BNP at 748, baseline was 612 five months ago.  Troponin minimally elevated at 0.038.    At time of my evaluation pulse 85, respirations 20, /55 sign 95% on ventilator.    Patient admitted to ICU.  Cardiology consult for suspected flash pulmonary edema/CHF, pulmonology consulted for vent management.    He repeat BMP is pending to re-evaluate potassium as hemolysis is suspected.                 Past Medical History:   Diagnosis Date    Allergy     Anxiety     Arthritis     Cancer     Diabetes mellitus, type 2     Hyperlipidemia     Hypertension        Past Surgical History:   Procedure Laterality Date    APPENDECTOMY      CHOLECYSTECTOMY         Review of patient's allergies indicates:   Allergen Reactions    Ciprofloxacin hcl Nausea Only       No current facility-administered medications on file prior to encounter.     Current Outpatient Medications on File Prior to Encounter   Medication Sig    albuterol-ipratropium (DUO-NEB) 2.5 mg-0.5 mg/3 mL nebulizer solution Take 3 mLs by nebulization every 6 (six) hours as needed for Wheezing. Rescue    alendronate (FOSAMAX) 70 MG tablet TAKE 1 TABLET BY MOUTH EVERY WEEK    amLODIPine (NORVASC) 5 MG tablet TAKE 1 TABLET(5 MG) BY MOUTH EVERY DAY    aspirin (ECOTRIN) 81 MG EC tablet Take 1 tablet (81 mg total) by mouth once daily.    atorvastatin (LIPITOR) 20 MG tablet Take 1 tablet (20 mg total) by mouth once daily.    calcium carbonate-vitamin D3 (CALCIUM 600 WITH VITAMIN D3) 600 mg(1,500mg) -400 unit Chew Take 1 tablet by mouth.     clopidogreL (PLAVIX) 75 mg tablet Take 1 tablet (75 mg total) by mouth once daily.    exemestane (AROMASIN) 25 mg tablet Take 25 mg by mouth once daily.     furosemide (LASIX) 20 MG tablet Take 1 tablet (20 mg total) by mouth once daily.    gabapentin (NEURONTIN) 100 MG capsule TAKE 1 CAPSULE(100 MG) BY MOUTH THREE TIMES DAILY    glipiZIDE (GLUCOTROL) 2.5 MG TR24 TAKE 1 TABLET(2.5 MG) BY MOUTH EVERY DAY    irbesartan (AVAPRO) 150 MG tablet TAKE 1 TABLET(150 MG) BY MOUTH EVERY DAY    medical supply, miscellaneous (MISCELLANEOUS MEDICAL SUPPLY [facility]) Compression sleeve; apply daily    metoprolol succinate (TOPROL-XL) 25 MG 24 hr tablet Take 25 mg by mouth.    nystatin-triamcinolone (MYCOLOG II) cream APPLY EXTERNALLY TO THE AFFECTED AREA EVERY DAY    omeprazole (PRILOSEC) 20 MG capsule TAKE 1 CAPSULE BY MOUTH TWICE DAILY AS NEEDED.    triamcinolone acetonide 0.1% (KENALOG) 0.1 % cream Apply topically 3 (three) times daily.     Family History     Problem Relation (Age of Onset)    No Known Problems Mother    Tuberculosis Father        Tobacco Use    Smoking status: Former Smoker     Quit date: 9/15/1975     Years since quittin.3    Smokeless tobacco: Not on file   Substance and Sexual Activity    Alcohol use: Yes     Alcohol/week: 2.0 standard drinks     Types: 2 Glasses of wine per week    Drug use: No    Sexual activity: Never     Review of Systems   Unable to perform ROS: Intubated     Objective:     Vital Signs (Most Recent):  Temp: 96 °F (35.6 °C) (21)  Pulse: 78 (21)  Resp: 18 (21)  BP: (!) 107/59 (21)  SpO2: 95 % (21) Vital Signs (24h Range):  Temp:  [96 °F (35.6 °C)-96.5 °F (35.8 °C)] 96 °F (35.6 °C)  Pulse:  [38-86] 78  Resp:  [18-43] 18  SpO2:  [89 %-100 %] 95 %  BP: ()/() 107/59     Weight: 73.5 kg (162 lb)  Body mass index is 29.63 kg/m².    Physical Exam    General:  Spontaneously opening eyes.  Intubated.    HEENT:   Normocephalic  Cardiovascular:  Tachycardic, murmur noted.  No lower extremity edema.  Pulmonary:  Transmitted ventilator sounds  Abdomen:  Soft, mildly tender to palpation, nondistended.  No guarding.  No rebound.  Negative Jackson's.  Positive bowel sounds.  Extremity:  Moves all extremities equally.  Dermatology:  No rashes appreciated on exposed skin  Psychiatric:  Intubated       Significant Labs:   All pertinent labs within the past 24 hours have been reviewed.  A1C:   Recent Labs   Lab 07/09/21  0551   HGBA1C 6.2*     CBC:   Recent Labs   Lab 12/30/21 1925   WBC 16.68*   HGB 11.3*   HCT 34.9*        CMP:   Recent Labs   Lab 12/30/21 1925   *   K 5.6*   CL 98   CO2 19*   *   BUN 35*   CREATININE 1.9*   CALCIUM 9.7   PROT 8.1   ALBUMIN 3.8   BILITOT 0.4   ALKPHOS 62   AST 24   ALT 23   ANIONGAP 12   EGFRNONAA 24*     Cardiac Markers:   Recent Labs   Lab 12/30/21 1925   *     Lactic Acid: No results for input(s): LACTATE in the last 48 hours.  Lipid Panel: No results for input(s): CHOL, HDL, LDLCALC, TRIG, CHOLHDL in the last 48 hours.  Magnesium:   Recent Labs   Lab 12/30/21 1925   MG 2.2     Troponin:   Recent Labs   Lab 12/30/21 1925   TROPONINI 0.038*     TSH:   Recent Labs   Lab 07/09/21  0551   TSH 6.759*       Significant Imaging: I have reviewed all pertinent imaging results/findings within the past 24 hours.    Assessment/Plan:     * Congestive heart failure  Given findings on chest x-ray of pulmonary congestion and frothy sputum noted at time of intubation, start Lasix 40 mg IV b.i.d.  Echocardiogram on 07/09/2021 with EF of 60%, therefore do not want to be too aggressive with diuresis as likely preload dependent  Cardiology consulted  Intubated in ICU  Serial troponins        Hyperkalemia  Repeat potassium as BMP thought to be hemolyzed      Atrial fibrillation with rapid ventricular response  History of AFib  Not in RVR at this time        Hyperlipidemia        Type 2  diabetes mellitus without complication  Accu-Cheks  Sliding scale insulin      Essential hypertension  Actually hypotensive  Continue dobutamine titrate to MA P greater than 65  Start Levophed GTT p.r.n.            VTE Risk Mitigation (From admission, onward)         Ordered     enoxaparin injection 30 mg  Daily         12/30/21 2314     IP VTE HIGH RISK PATIENT  Once         12/30/21 2314     Place sequential compression device  Until discontinued         12/30/21 2314                   Abad Phelps MD  Department of Hospital Medicine   [facility] - Emergency Dept

## 2021-12-31 NOTE — CONSULTS
[facility] - Intensive Care  Cardiology  Consult Note    Patient Name: Vinita Andrews  MRN: [id number]  Admission Date: 12/30/2021  Hospital Length of Stay: 1 days  Code Status: Full Code   Attending Provider: Chloe Parks MD   Consulting Provider: Jimmy Robertson MD  Primary Care Physician: Louis Burt MD  Principal Problem:Acute hypoxemic respiratory failure    Patient information was obtained from past medical records and ER records.     Cardiology  Consult performed by: Jimmy Robertson MD  Consult ordered by: Abad Phelps MD        Subjective:     Chief Complaint:  Shortness of breath     HPI:   Vinita Andrews is an 86 y.o. female who presented with respiratory distress.  Hypoxia.  Blood pressure was significantly elevated in the emergency room.  Patient was intubated in the emergency room.  Blood pressure improved after intubation as well as treatment with IV meds, hydralazine.  Chest x-ray suggestive of pulmonary edema.  Question of a right-sided infiltrate.  Patient has a history of breast cancer.  History of mastectomy.  Lymphedema.  Severe aortic stenosis.  Diastolic heart failure secondary to valvular abnormality.  Creatinine was elevated on presentation.  BNP was elevated.  EKG showed sinus bradycardia.  IVCD.  Latest troponin 0.095.    Echocardiogram 7/9/21:    · The left ventricle is normal in size with concentric hypertrophy and normal systolic function.  · The estimated ejection fraction is 60%.  · Grade I left ventricular diastolic dysfunction.  · Normal right ventricular size with normal right ventricular systolic function.  · Moderate left atrial enlargement.  · There is severe aortic valve stenosis.  · Aortic valve area is 0.77 cm2; peak velocity is 4.58 m/s; mean gradient is 51 mmHg.  · Mild aortic regurgitation.  · Normal central venous pressure (3 mmHg).  · The estimated PA systolic pressure is 29 mmHg.  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 4.0 cm       Echo 6/28/21  Mario Albertoo   1. Estimated left ventricular ejection fraction is 55 to 60%.    2. Left atrium is severely dilated.    3. Mild to moderate aortic regurgitation.    4. Normal left ventricular systolic function.    5. Moderate to severe aortic valve stenosis.    6. Left ventricular wall thickness is mildly increased.    7. Moderate mitral stenosis.    8. Abnormal left ventricular strain (-15.0 %). -15.1 %.    9. Mild mitral valve regurgitation.   10. Mildly elevated systolic pulmonary artery pressure.   11. Moderate aortic annular calcification.   12. Right ventricular systolic function is mildly reduced.           Past Medical History:   Diagnosis Date    Allergy     Anxiety     Arthritis     Cancer     Diabetes mellitus, type 2     Hyperlipidemia     Hypertension        Past Surgical History:   Procedure Laterality Date    APPENDECTOMY      CHOLECYSTECTOMY         Review of patient's allergies indicates:   Allergen Reactions    Ciprofloxacin hcl Nausea Only       No current facility-administered medications on file prior to encounter.     Current Outpatient Medications on File Prior to Encounter   Medication Sig    albuterol-ipratropium (DUO-NEB) 2.5 mg-0.5 mg/3 mL nebulizer solution Take 3 mLs by nebulization every 6 (six) hours as needed for Wheezing. Rescue    alendronate (FOSAMAX) 70 MG tablet TAKE 1 TABLET BY MOUTH EVERY WEEK    amLODIPine (NORVASC) 5 MG tablet TAKE 1 TABLET(5 MG) BY MOUTH EVERY DAY    aspirin (ECOTRIN) 81 MG EC tablet Take 1 tablet (81 mg total) by mouth once daily.    atorvastatin (LIPITOR) 20 MG tablet Take 1 tablet (20 mg total) by mouth once daily.    calcium carbonate-vitamin D3 (CALCIUM 600 WITH VITAMIN D3) 600 mg(1,500mg) -400 unit Chew Take 1 tablet by mouth.    clopidogreL (PLAVIX) 75 mg tablet Take 1 tablet (75 mg total) by mouth once daily.    exemestane (AROMASIN) 25 mg tablet Take 25 mg by mouth once daily.     furosemide (LASIX) 20 MG tablet Take 1 tablet (20 mg  total) by mouth once daily.    gabapentin (NEURONTIN) 100 MG capsule TAKE 1 CAPSULE(100 MG) BY MOUTH THREE TIMES DAILY    glipiZIDE (GLUCOTROL) 2.5 MG TR24 TAKE 1 TABLET(2.5 MG) BY MOUTH EVERY DAY    irbesartan (AVAPRO) 150 MG tablet TAKE 1 TABLET(150 MG) BY MOUTH EVERY DAY    medical supply, miscellaneous ([facility]) Compression sleeve; apply daily    metoprolol succinate (TOPROL-XL) 25 MG 24 hr tablet Take 25 mg by mouth.    nystatin-triamcinolone (MYCOLOG II) cream APPLY EXTERNALLY TO THE AFFECTED AREA EVERY DAY    omeprazole (PRILOSEC) 20 MG capsule TAKE 1 CAPSULE BY MOUTH TWICE DAILY AS NEEDED.    triamcinolone acetonide 0.1% (KENALOG) 0.1 % cream Apply topically 3 (three) times daily.     Family History     Problem Relation (Age of Onset)    No Known Problems Mother    Tuberculosis Father        Tobacco Use    Smoking status: Former Smoker     Quit date: 9/15/1975     Years since quittin.3    Smokeless tobacco: Not on file   Substance and Sexual Activity    Alcohol use: Yes     Alcohol/week: 2.0 standard drinks     Types: 2 Glasses of wine per week    Drug use: No    Sexual activity: Never     Review of Systems   Unable to perform ROS: intubated     Objective:     Vital Signs (Most Recent):  Temp: 99.86 °F (37.7 °C) (21)  Pulse: 78 (21)  Resp: (!) 24 (21)  BP: (!) 86/52 (21)  SpO2: 97 % (21) Vital Signs (24h Range):  Temp:  [95.9 °F (35.5 °C)-99.86 °F (37.7 °C)] 99.86 °F (37.7 °C)  Pulse:  [38-86] 78  Resp:  [17-43] 24  SpO2:  [89 %-100 %] 97 %  BP: ()/() 86/     Weight: 73.6 kg (162 lb 4.1 oz)  Body mass index is 29.68 kg/m².    SpO2: 97 %  O2 Device (Oxygen Therapy): Vapotherm      Intake/Output Summary (Last 24 hours) at 2021 1002  Last data filed at 2021 0927  Gross per 24 hour   Intake 618.18 ml   Output 2175 ml   Net -1556.82 ml       Lines/Drains/Airways     Central Venous Catheter  Line            Percutaneous Central Line Insertion/Assessment - Triple Lumen  12/30/21 2115 right internal jugular <1 day          Drain                 Urethral Catheter 12/30/21 2200 16 Fr. <1 day          Airway                 Airway - Non-Surgical 12/30/21 2000 Endotracheal Tube <1 day         Airway - Non-Surgical 12/30/21 2026 Endotracheal Tube <1 day          Peripheral Intravenous Line                 Peripheral IV - Single Lumen 12/30/21 2100 22 G Right Hand <1 day                Physical Exam  Vitals reviewed.   Constitutional:       General: She is not in acute distress.     Appearance: She is not diaphoretic.   Neck:      Vascular: No carotid bruit or JVD.   Cardiovascular:      Rate and Rhythm: Normal rate and regular rhythm.      Pulses: Normal pulses.      Heart sounds: Murmur heard.    Harsh crescendo midsystolic murmur is present with a grade of 3/6 at the upper right sternal border radiating to the neck.      Pulmonary:      Breath sounds: Decreased air movement present.   Abdominal:      General: Bowel sounds are normal.      Palpations: Abdomen is soft.      Tenderness: There is no abdominal tenderness.   Musculoskeletal:      Right lower leg: No edema.      Left lower leg: No edema.   Neurological:      Mental Status: She is alert.   Psychiatric:         Mood and Affect: Mood and affect normal.         Significant Labs:   CMP   Recent Labs   Lab 12/30/21 1925 12/31/21 0234 12/31/21  0241   * 129* 129*   K 5.6* 4.8 4.8   CL 98 94* 94*   CO2 19* 22* 23   * 229* 230*   BUN 35* 37* 37*   CREATININE 1.9* 1.9* 1.8*   CALCIUM 9.7 9.3 9.3   PROT 8.1 7.6  --    ALBUMIN 3.8 3.6  --    BILITOT 0.4 0.4  --    ALKPHOS 62 55  --    AST 24 24  --    ALT 23 21  --    ANIONGAP 12 13 12   ESTGFRAFRICA 27* 27* 29*   EGFRNONAA 24* 24* 25*   , CBC   Recent Labs   Lab 12/30/21 1925 12/30/21 1925 12/31/21 0234   WBC 16.68*  --  17.43*   HGB 11.3*  --  10.7*   HCT 34.9*   < > 32.5*     --   302    < > = values in this interval not displayed.   , Lipid Panel No results for input(s): CHOL, HDL, LDLCALC, TRIG, CHOLHDL in the last 48 hours. and Troponin   Recent Labs   Lab 12/30/21  1925 12/31/21  0234 12/31/21  0842   TROPONINI 0.038* 0.077* 0.095*       Significant Imaging: Echocardiogram:   Transthoracic echo (TTE) complete ([facility]upid Only):   Results for orders placed or performed during the hospital encounter of 07/09/21   Echo   Result Value Ref Range    BSA 1.86 m2    TDI SEPTAL 0.03 m/s    LV LATERAL E/E' RATIO 33.50 m/s    LV SEPTAL E/E' RATIO 44.67 m/s    LA WIDTH 5.17 cm    AORTIC VALVE CUSP SEPERATION 1.18 cm    TDI LATERAL 0.04 m/s    PV PEAK VELOCITY 1.11 cm/s    LVIDd 3.18 (A) 3.5 - 6.0 cm    IVS 2.01 (A) 0.6 - 1.1 cm    Posterior Wall 1.96 (A) 0.6 - 1.1 cm    Ao root annulus 4.41 cm    LVIDs 2.17 2.1 - 4.0 cm    FS 32 28 - 44 %    LA volume 81.33 cm3    Sinus 4.04 cm    STJ 3.17 cm    Ascending aorta 3.28 cm    LV mass 277.96 g    LA size 2.75 cm    RVDD 3.72 cm    TAPSE 1.29 cm    RV S' 7.78 cm/s    Left Ventricle Relative Wall Thickness 1.23 cm    AV mean gradient 51 mmHg    AV valve area 0.77 cm2    AV Velocity Ratio 0.29     AV index (prosthetic) 0.26     MV mean gradient 1 mmHg    MV valve area p 1/2 method 2.00 cm2    E/A ratio 0.75     Mean e' 0.04 m/s    E wave deceleration time 380.12 msec    IVRT 113.80 msec    Pulm vein S/D ratio 0.88     LVOT diameter 1.96 cm    LVOT area 3.0 cm2    LVOT peak kenney 1.31 m/s    LVOT peak VTI 27.75 cm    Ao peak kenney 4.58 m/s    Ao .99 cm    LVOT stroke volume 83.68 cm3    AV peak gradient 84 mmHg    MV peak gradient 15 mmHg    E/E' ratio 38.29 m/s    MV Peak E Kenney 1.34 m/s    TR Max Kenney 2.56 m/s    MV stenosis pressure 1/2 time 110.23 ms    MV Peak A Kenney 1.78 m/s    PV Peak S Kenney 0.29 m/s    PV Peak D Kenney 0.33 m/s    LV Systolic Volume 15.74 mL    LV Systolic Volume Index 8.7 mL/m2    LV Diastolic Volume 40.39 mL    LV Diastolic Volume Index  22.31 mL/m2    LA Volume Index 44.9 mL/m2    LV Mass Index 154 g/m2    RA Major Axis 5.90 cm    Left Atrium Minor Axis 6.73 cm    Left Atrium Major Axis 6.73 cm    Triscuspid Valve Regurgitation Peak Gradient 26 mmHg    RA Width 2.75 cm    Right Atrial Pressure (from IVC) 3 mmHg    EF 60 %    TV rest pulmonary artery pressure 29 mmHg    Narrative    · The left ventricle is normal in size with concentric hypertrophy and   normal systolic function.  · The estimated ejection fraction is 60%.  · Grade I left ventricular diastolic dysfunction.  · Normal right ventricular size with normal right ventricular systolic   function.  · Moderate left atrial enlargement.  · There is severe aortic valve stenosis.  · Aortic valve area is 0.77 cm2; peak velocity is 4.58 m/s; mean gradient   is 51 mmHg.  · Mild aortic regurgitation.  · Normal central venous pressure (3 mmHg).  · The estimated PA systolic pressure is 29 mmHg.  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 4.0 cm        Assessment and Plan:     Severe aortic stenosis  12/31/21: Last DAV 0.7.    Elevated troponin  12/31/21: secondary to hypertension, aortic stenosis.    Essential hypertension  12/31/21: Hypertensive emergency on presentation. Transient hypotension. Currently stable.    Congestive heart failure  Patient is identified as having Diastolic (HFpEF) heart failure that is Acute on chronic. CHF is currently uncontrolled.  Hypertensive emergency on presentation.  Known severe aortic stenosis.  Latest ECHO performed and demonstrates- Results for orders placed during the hospital encounter of 07/09/21    Echo    Interpretation Summary  · The left ventricle is normal in size with concentric hypertrophy and normal systolic function.  · The estimated ejection fraction is 60%.  · Grade I left ventricular diastolic dysfunction.  · Normal right ventricular size with normal right ventricular systolic function.  · Moderate left atrial enlargement.  · There is severe aortic  valve stenosis.  · Aortic valve area is 0.77 cm2; peak velocity is 4.58 m/s; mean gradient is 51 mmHg.  · Mild aortic regurgitation.  · Normal central venous pressure (3 mmHg).  · The estimated PA systolic pressure is 29 mmHg.  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 4.0 cm  . Continue Beta Blocker and ACE/ARB and monitor clinical status closely. Monitor on telemetry. Patient is on CHF pathway.  Monitor strict Is&Os and daily weights.  Place on fluid restriction of 1.5 L. Continue to stress to patient importance of self efficacy and  on diet for CHF. Last BNP reviewed- and noted below   Recent Labs   Lab 12/30/21 1925   *   .  12/31/2021:  Continue diuresis.  Blood pressure control.  Avoid excessive afterload reduction.        VTE Risk Mitigation (From admission, onward)         Ordered     enoxaparin injection 30 mg  Daily         12/30/21 2314     IP VTE HIGH RISK PATIENT  Once         12/30/21 2314     Place sequential compression device  Until discontinued         12/30/21 2314              Critical Care Time: 35 minutes     Critical care was time spent personally by me on the following activities: development of treatment plan with patient or surrogate and bedside caregivers, discussions with consultants, evaluation of patient's response to treatment, examination of patient, ordering and performing treatments and interventions, ordering and review of laboratory studies, ordering and review of radiographic studies, pulse oximetry, re-evaluation of patient's condition. This critical care time did not overlap with that of any other provider or involve time for any procedures.        Thank you for your consult. I will follow-up with patient. Please contact us if you have any additional questions.    Jimmy Robertson MD  Cardiology   [facility] - Intensive Care

## 2021-12-31 NOTE — ED PROVIDER NOTES
Encounter Date: 2021    SCRIBE #1 NOTE: I, Dylan Vela, am scribing for, and in the presence of, Giovani Han MD.       History     Chief Complaint   Patient presents with    Shortness of Breath     Pt to ED via [facility] for severe SOB. EMS reports O2 65-70%, HR 40-50's. Pt aaox3. Pt in respiratory distress on arrival on 15L NRB at 85% per EMS. Pt diaphoretic, IV pulled out on arrival from right hand.      Vinita Andrews is a 86 y.o. female with a past medical history of diabetes mellitus, breast cancer, postmastectomy lymphadema syndrome, and diastolic heart failure who presents to the Emergency Department via EMS from [facility] for evaluation of respiratory distress. Per EMS, patient's oxygen saturation was in the 65-70% range at the time of their intervention. Nursing staff reports that the patient may have aspirated while eating dinner. Patient denies any aspiration, choking, or vomiting. However, she states that she has had abdominal pain and nausea since 1300 today. She does not note any other associated symptoms.      The history is provided by the patient, the nursing home and the EMS personnel.     Review of patient's allergies indicates:   Allergen Reactions    Ciprofloxacin hcl Nausea Only     Past Medical History:   Diagnosis Date    Allergy     Anxiety     Arthritis     Cancer     Diabetes mellitus, type 2     Hyperlipidemia     Hypertension      Past Surgical History:   Procedure Laterality Date    APPENDECTOMY      CHOLECYSTECTOMY       Family History   Problem Relation Age of Onset    No Known Problems Mother     Tuberculosis Father      Social History     Tobacco Use    Smoking status: Former Smoker     Quit date: 9/15/1975     Years since quittin.3   Substance Use Topics    Alcohol use: Yes     Alcohol/week: 2.0 standard drinks     Types: 2 Glasses of wine per week    Drug use: No     Review of Systems   Constitutional: Negative for  fever.   HENT: Negative for sore throat.    Eyes: Negative for pain.   Respiratory: Positive for shortness of breath. Negative for choking.    Cardiovascular: Negative for chest pain.   Gastrointestinal: Positive for abdominal pain and nausea. Negative for vomiting.   Genitourinary: Negative for dysuria.   Musculoskeletal: Negative for back pain.   Skin: Negative for rash.   Neurological: Negative for weakness.       Physical Exam     Initial Vitals   BP Pulse Resp Temp SpO2   12/30/21 1910 12/30/21 1910 12/30/21 1910 12/30/21 2146 12/30/21 1910   (!) 183/107 (!) 54 (!) 35 96.5 °F (35.8 °C) 98 %      MAP       --                Physical Exam    Nursing note and vitals reviewed.  Constitutional: She appears well-developed and well-nourished.   HENT:   Head: Normocephalic and atraumatic.   Eyes: EOM are normal. Pupils are equal, round, and reactive to light.   Neck: Neck supple. No thyromegaly present. No JVD present.   Normal range of motion.  Cardiovascular: Normal rate, regular rhythm, normal heart sounds and intact distal pulses. Exam reveals no gallop and no friction rub.    No murmur heard.  Pulmonary/Chest: She is in respiratory distress. She has rhonchi (left lower lung).   Abdominal: Abdomen is soft. Bowel sounds are normal. There is abdominal tenderness (mild; non-specific). There is no rebound and no guarding.   Musculoskeletal:         General: No tenderness or edema. Normal range of motion.      Cervical back: Normal range of motion and neck supple.     Neurological: She is alert and oriented to person, place, and time. She has normal strength.   Skin: Skin is warm and dry.         ED Course   Critical Care    Date/Time: 12/30/2021 11:00 PM  Performed by: Giovani Han MD  Authorized by: Giovani Han MD   Direct patient critical care time: 30 minutes  Additional history critical care time: 10 minutes  Ordering / reviewing critical care time: 10 minutes  Documentation critical care time: 10  minutes  Consulting other physicians critical care time: 5 minutes  Total critical care time (exclusive of procedural time) : 65 minutes  Critical care time was exclusive of separately billable procedures and treating other patients and teaching time.  Critical care was necessary to treat or prevent imminent or life-threatening deterioration of the following conditions: respiratory failure.  Critical care was time spent personally by me on the following activities: development of treatment plan with patient or surrogate, discussions with consultants, interpretation of cardiac output measurements, evaluation of patient's response to treatment, examination of patient, obtaining history from patient or surrogate, ordering and performing treatments and interventions, ordering and review of laboratory studies, ordering and review of radiographic studies, pulse oximetry, re-evaluation of patient's condition, review of old charts and ventilator management.    Intubation    Date/Time: 12/30/2021 11:00 PM  Location procedure was performed: [facility] EMERGENCY DEPARTMENT  Performed by: Giovani Han MD  Authorized by: Giovani Han MD   Consent Done: Yes  Consent: Verbal consent obtained.  Risks and benefits: risks, benefits and alternatives were discussed  Consent given by: patient  Indications: respiratory distress,  respiratory failure and  hypoxemia  Intubation method: direct  Patient status: paralyzed (RSI)  Preoxygenation: bipap.  Sedatives: etomidate  Paralytic: succinylcholine  Laryngoscope size: Glide 4  Tube size: 7.0 mm  Tube type: cuffed  Number of attempts: 1  Cricoid pressure: no  Cords visualized: yes  Post-procedure assessment: CO2 detector and chest rise  Breath sounds: rales/crackles  Cuff inflated: yes  ETT to lip: 19 cm  Tube secured with: ETT hoskins  Chest x-ray interpreted by me.  Chest x-ray findings: endotracheal tube too low  Tube repositioned: tube repositioned successfully  Patient  tolerance: Patient tolerated the procedure well with no immediate complications    Central Line    Date/Time: 12/30/2021 11:02 PM  Performed by: Giovani Han MD  Authorized by: Giovani Han MD     Location procedure was performed:  [facility] EMERGENCY DEPARTMENT  Consent Done ?:  Emergent Situation  Time out complete?: Verified correct patient, procedure, equipment, staff, and site/side    Indications:  Vascular access, hemodynamic monitoring and med administration  Anesthesia:  Local infiltration  Local anesthetic:  Lidocaine 1% without epinephrine  Anesthetic total (ml):  2  Preparation:  Skin prepped with ChloraPrep  Skin prep agent dried: Skin prep agent completely dried prior to procedure    Sterile barriers: All five maximal sterile barriers used - gloves, gown, cap, mask and large sterile sheet    Hand hygiene: Hand hygiene performed immediately prior to central venous catheter insertion    Location:  Right internal jugular  Catheter type:  Triple lumen  Catheter size:  7 Fr  Ultrasound guidance: Yes    Vessel Caliber:  Medium  Comprressibility:  Normal  Needle advanced into vessel with real time ultrasound guidance.    Guidewire confirmed in vessel.    Steril sheath on probe.    Sterile gel used.  Manometry: No    Number of attempts:  1  Securement:  Line sutured, chlorhexidine patch, sterile dressing applied and blood return through all ports  Complications: No    Estimated blood loss (mL):  2  XRay:  Placement verified by x-ray, no pneumothorax on x-ray and successful placement  Adverse Events:  NoneTermination Site: superior vena cava      Labs Reviewed   CBC W/ AUTO DIFFERENTIAL - Abnormal; Notable for the following components:       Result Value    WBC 16.68 (*)     RBC 3.52 (*)     Hemoglobin 11.3 (*)     Hematocrit 34.9 (*)     MCV 99 (*)     MCH 32.1 (*)     RDW 15.5 (*)     Immature Granulocytes 2.3 (*)     Gran # (ANC) 10.7 (*)     Immature Grans (Abs) 0.39 (*)     All other components  within normal limits   COMPREHENSIVE METABOLIC PANEL - Abnormal; Notable for the following components:    Sodium 129 (*)     Potassium 5.6 (*)     CO2 19 (*)     Glucose 205 (*)     BUN 35 (*)     Creatinine 1.9 (*)     eGFR if  27 (*)     eGFR if non  24 (*)     All other components within normal limits   TROPONIN I - Abnormal; Notable for the following components:    Troponin I 0.038 (*)     All other components within normal limits   B-TYPE NATRIURETIC PEPTIDE - Abnormal; Notable for the following components:     (*)     All other components within normal limits   URINALYSIS, REFLEX TO URINE CULTURE - Abnormal; Notable for the following components:    Appearance, UA Hazy (*)     Protein, UA Trace (*)     Leukocytes, UA 3+ (*)     All other components within normal limits    Narrative:     Specimen Source->Urine   URINALYSIS MICROSCOPIC - Abnormal; Notable for the following components:    WBC, UA 32 (*)     WBC Clumps, UA Occasional (*)     Bacteria Few (*)     Hyaline Casts, UA 34 (*)     All other components within normal limits    Narrative:     Specimen Source->Urine   ISTAT PROCEDURE - Abnormal; Notable for the following components:    POC PO2 248 (*)     POC HCO3 23.4 (*)     All other components within normal limits   SARS-COV-2 RDRP GENE - Normal   CULTURE, BLOOD   CULTURE, BLOOD   CULTURE, URINE   LIPASE   MAGNESIUM          Imaging Results          X-ray Abdomen for NG Tube Placement (Nursing should notify Radiology after placement) (Final result)  Result time 12/30/21 21:44:22    Final result by Fabienne Bolivar MD (12/30/21 21:44:22)                 Impression:      NG tube in place.      Electronically signed by: Fabienne Bolivar MD  Date:    12/30/2021  Time:    21:44             Narrative:    EXAMINATION:  XR NON-RADIOLOGIST PERFORMED NG/GASTRIC TUBE CHECK    CLINICAL HISTORY:  og tube placement;    COMPARISON:  None    FINDINGS:  Enteric tube extends well below  the diaphragm curled within the gastric fundus.                               X-Ray Chest 1 View (Final result)  Result time 12/30/21 21:43:46    Final result by Fabienne Bolivar MD (12/30/21 21:43:46)                 Impression:      As above.      Electronically signed by: Fabienne Bolivar MD  Date:    12/30/2021  Time:    21:43             Narrative:    EXAMINATION:  XR CHEST 1 VIEW    CLINICAL HISTORY:  Hypotension, unspecified    TECHNIQUE:  Single frontal view of the chest was performed.    COMPARISON:  20:42.    FINDINGS:  Right-sided central venous catheter is visualized with distal tip over the SVC.  ET tube is visualized with distal tip approximately 5 cm above the armen.  No significant change cardiopulmonary status from earlier exam.  No pneumothorax.                               CT Abdomen Pelvis  Without Contrast (In process)                 X-Ray Chest 1 View (Final result)  Result time 12/30/21 21:11:54    Final result by Adonis Valderrama MD (12/30/21 21:11:54)                 Impression:      1. Endotracheal tube present with tip in the proximal right mainstem bronchus.  Recommend retracting the endotracheal tube 4-5 cm for endotracheal placement.  2. Prominent bilateral patchy infiltrate and consolidation, right greater than left, similar to the prior study.  Follow-up recommended.  3.  This report was flagged in Epic as abnormal.      Electronically signed by: Adonis Valderrama  Date:    12/30/2021  Time:    21:11             Narrative:    EXAMINATION:  XR CHEST 1 VIEW    CLINICAL HISTORY:  Encounter for other preprocedural examination    TECHNIQUE:  Single frontal view of the chest was performed.    COMPARISON:  12/30/2021    FINDINGS:  Endotracheal tube present with tip in the proximal right mainstem bronchus.  Recommend retracting the endotracheal tube 4-5 cm.    NG tube present.    Cardiac silhouette is upper normal size.    Prominent bilateral patchy infiltrate and consolidation right greater  than left.  Follow-up recommended.    Cardiac pads overlie the left chest.    No large effusion.  No evidence of pneumothorax.  No acute osseous abnormality                               X-Ray Chest AP Portable (Final result)  Result time 12/30/21 19:51:38    Final result by Fabienne Bolivar MD (12/30/21 19:51:38)                 Impression:      Moderate pulmonary edema versus multifocal/atypical pneumonia in the right clinical setting.      Electronically signed by: Fabienne Bolivar MD  Date:    12/30/2021  Time:    19:51             Narrative:    EXAMINATION:  XR CHEST AP PORTABLE    CLINICAL HISTORY:  SOB;    TECHNIQUE:  Single frontal view of the chest was performed.    COMPARISON:  07/09/2021.    FINDINGS:  Cardiac silhouette is stable in size.  Lungs are symmetrically expanded.  Increased interstitial attenuation and bilateral perihilar opacification are seen.  More focal regions of consolidation are seen in the left mid and bilateral lower lung zones.  No pneumothorax or large pleural effusion seen.  No acute osseous abnormality identified.                                 Medications   DOPamine 400 mg in dextrose 5 % 250 mL infusion (premix) (10 mcg/kg/min × 73.5 kg Intravenous New Bag 12/30/21 2106)   atropine injection 0.5 mg (has no administration in time range)   atropine 0.1 mg/mL injection (has no administration in time range)   dexmedetomidine (PRECEDEX) 400mcg/100mL 0.9% NaCL infusion (0.3 mcg/kg/hr × 73.5 kg Intravenous New Bag 12/30/21 2214)   dexmedetomidine IV bolus from bag/infusion 73.5 mcg (has no administration in time range)   cefTRIAXone (ROCEPHIN) 2 g/50 mL D5W IVPB (2 g Intravenous New Bag 12/30/21 2220)   NORepinephrine 4 mg in dextrose 5% 250 mL infusion (premix) (titrating) (has no administration in time range)   ondansetron injection 8 mg (8 mg Intravenous Given 12/30/21 1933)   albuterol sulfate nebulizer solution 10 mg (10 mg Nebulization Given 12/30/21 1944)   ipratropium 0.02 %  nebulizer solution 1 mg (1 mg Nebulization Given 12/30/21 1945)   hydrALAZINE injection 10 mg (10 mg Intravenous Given 12/30/21 1950)   nitroGLYCERIN SL tablet 0.4 mg (0.4 mg Sublingual Given 12/30/21 1959)   nitroGLYCERIN 2% TD oint ointment 1 inch (1 inch Topical (Top) Given 12/30/21 1953)   furosemide injection 40 mg (40 mg Intravenous Given 12/30/21 1953)   succinylcholine injection 110 mg (110 mg Intravenous Given 12/30/21 2024)   etomidate injection 20 mg (20 mg Intravenous Given 12/30/21 2024)   propofol (DIPRIVAN) 10 mg/mL infusion (0 mcg/kg/min × 73.5 kg Intravenous Stopped 12/30/21 2204)   fentaNYL 50 mcg/mL injection 100 mcg (100 mcg Intravenous Given 12/30/21 2055)   midazolam (VERSED) 1 mg/mL injection 4 mg (4 mg Intravenous Given 12/30/21 2055)   azithromycin 500 mg in dextrose 5 % 250 mL IVPB (ready to mix system) (500 mg Intravenous New Bag 12/30/21 2201)     Medical Decision Making:   History:   Old Medical Records: I decided to obtain old medical records.  Clinical Tests:   Lab Tests: Reviewed and Ordered  Radiological Study: Ordered and Reviewed  Medical Tests: Reviewed and Ordered  Patient presented to the ED form the nursing home with reported possible aspiration. Patient appears lucid and answering questions. States she does not remember choking. States she has had abdominal pain and nausea since 1 pm today. She states she has chest heaviness on arrival. EKG shows no STEMI. It does show nonspecific intraventricular block and sinus bradycardia. The block has been seen prior EKG.  Patient arrived hypoxic in the 80%'s with NRB and placed on Bipap. Patient not tolerating bipap due to copious frothy sputum filling the bipap mask.  Patient verbally asked to be sedated and intubated and indicated she was tiring. Initially though to be in hypertensive emergency with flash pulmonary edema. Initial bp was 230's/100's during my evaluation. Hydralazine 10mg IV, Nitro SL and Nitropaste given. THis  overcorrected BP with SBP falling to 111 at time of intubation. WIth intubation BP fell further to SBP'70's.  This was brief and corrected with IVFs and dopamine. DUe to need for sedation Levaphed was also ordered PRN. And propofol changed to precedex.  Will CT scan abdomen although I suspect the abdominal pain is from the CHF. Dr Hicks will follow the CT scan results. Will admit to the ICU. Accepted by Dr. Phelps tot he ICU. Admitted to Dr. Parks. UA also shows UTI. Patient has blood cultures and has been given Rocephin and Azithromycin.         Scribe Attestation:   Scribe #1: I performed the above scribed service and the documentation accurately describes the services I performed. I attest to the accuracy of the note.                 Clinical Impression:   Final diagnoses:  [R06.02] SOB (shortness of breath)  [Z01.818] Encounter for intubation  [I95.9] Hypotension  [I95.9] Hypotension - central line placement  [Z45.2] Encounter for central line placement  [J81.0] Acute pulmonary edema (Primary)  [R09.02] Hypoxia          ED Disposition Condition    Admit             I, Giovani Herr, personally performed the services described in this documentation. All medical record entries made by the scribe were at my direction and in my presence. I have reviewed the chart and agree that the record reflects my personal performance and is accurate and complete.     Giovani Herr MD  12/30/21 0114

## 2021-12-31 NOTE — ASSESSMENT & PLAN NOTE
BP to max 225/89 on admit with pulmonary edema and respiratory failure. BP then became low and required vasopressors. Now off vasopressors.   - monitor BP  - hold BP Rx for now

## 2021-12-31 NOTE — EICU
Eicu brief admission review note.  Pt was examined on video, notes, images, labs  reviewed.  A/P: 85 y/o with h/o DM, HTN, HFpEF, breast CA s/p mastectomy with respiratory failure required intubation.  Hypoxemic respiratory failure- vent settings reviwed, 2/2 CHF vs PNA, on precedex for sedation, will add fentanyl prn.  HfpEF-Hypertensive on arrival, possibly some SCAPE component present, subsequently hypotensive, requiring pressors, on lasix bid  TANIA - prerenal vs cardiorenal, martino, monitor lytes, made 300 cc of urine after lasix dose  HypeK- hemolyzed specimen reported, recheck  Hypotension- antiHTN, sedation meds, vs septic shock from PNA,- LLL infiltrative changes on CT abdomen, no acute intraabdominal pathology, on ceftriaxone/Zithro, follow up cxs, try to wean off dopamine  UA c/w UTI- on Ceftriaxone, CT abdomen neg  Minimally elevated trop- no ischemic EKG changes, likely demand mediated, on ASA, Plavix, serial EKG, trend trops  DVT proph- Lovenox   D/w CHAVO

## 2021-12-31 NOTE — ED NOTES
"Dr. Han notified of patient respiratory status with no improvement on Bipap, patient continuously spitting up and vomiting in mask. Patient is still tachpyneic with accessory muscle use and retractions. Patient continuously c/o pain, appears very fatigued, reports "I can't breathe, can you put me to sleep."   "

## 2021-12-31 NOTE — ASSESSMENT & PLAN NOTE
TANIA on admit.    -- Avoid ACEI/ARB/NSAIDS/Nephrotoxins.   -- Renally dose all meds  -- Menard in place; monitor I&Os

## 2021-12-31 NOTE — CONSULTS
[facility] - Intensive Care  Pulmonology  Consult Note    Patient Name: Vinita Andrews  MRN: [id number]  Admission Date: 12/30/2021  Hospital Length of Stay: 1 days  Code Status: Full Code  Attending Physician: Chloe Parks MD  Primary Care Provider: Louis Burt MD   Principal Problem: Congestive heart failure    Subjective:     HPI:  Ms. Vinita Andrews is an 87 yo female with a PMHx of breast cancer with mets to bone and liver, postmastectomy lymphadema syndrome, diabetes mellitus, and diastolic heart failure who presents to the Emergency Department via EMS from [facility] for evaluation of respiratory distress. Per EMS, patient's oxygen saturation was in the 65-70% range at the time of their intervention. Nursing staff reports that the patient may have aspirated while eating dinner. Patient denies any aspiration, choking, or vomiting; however, she states that she has had abdominal pain and nausea since 1300 today. She does not note any other associated symptoms. Patient experienced chest tightness. Due to hypoxia and respiratory distress, patient was intubated in the emergency room and central line placed. At time of intubation, frothy sputum was noted.  Initially patient was hypertensive at 230/100.  After hydralazine, systolic blood pressure was down to around 110. After intubation with sedation, systolic blood pressure transiently went down to 52. Dopamine drip was started and MAP came up to around 65. History obtained from chart review as she was intubated for my exam.    Pulmonary was consulted for ventilator management.       Past Medical History:   Diagnosis Date    Allergy     Anxiety     Arthritis     Cancer     Diabetes mellitus, type 2     Hyperlipidemia     Hypertension        Past Surgical History:   Procedure Laterality Date    APPENDECTOMY      CHOLECYSTECTOMY         Review of patient's allergies indicates:   Allergen Reactions    Ciprofloxacin hcl  Nausea Only       Family History     Problem Relation (Age of Onset)    No Known Problems Mother    Tuberculosis Father        Tobacco Use    Smoking status: Former Smoker     Quit date: 9/15/1975     Years since quittin.3    Smokeless tobacco: Not on file   Substance and Sexual Activity    Alcohol use: Yes     Alcohol/week: 2.0 standard drinks     Types: 2 Glasses of wine per week    Drug use: No    Sexual activity: Never         Review of Systems   Unable to perform ROS: Intubated     Objective:     Vital Signs (Most Recent):  Temp: 99.86 °F (37.7 °C) (21)  Pulse: 78 (21)  Resp: (!) 36 (21)  BP: (!) 98/53 (21)  SpO2: 99 % (21) Vital Signs (24h Range):  Temp:  [95.9 °F (35.5 °C)-99.86 °F (37.7 °C)] 99.86 °F (37.7 °C)  Pulse:  [38-86] 78  Resp:  [17-43] 36  SpO2:  [89 %-100 %] 99 %  BP: ()/() 98/53     Weight: 73.6 kg (162 lb 4.1 oz)  Body mass index is 29.68 kg/m².      Intake/Output Summary (Last 24 hours) at 2021 0946  Last data filed at 2021 0927  Gross per 24 hour   Intake 618.18 ml   Output 2175 ml   Net -1556.82 ml       Physical Exam  Constitutional:       General: She is not in acute distress.     Appearance: She is ill-appearing.      Interventions: She is intubated and restrained.   HENT:      Head: Normocephalic and atraumatic.      Mouth/Throat:      Mouth: Mucous membranes are moist.      Pharynx: Oropharynx is clear.   Eyes:      General: No scleral icterus.     Pupils: Pupils are equal, round, and reactive to light.   Cardiovascular:      Rate and Rhythm: Normal rate and regular rhythm.      Pulses: Normal pulses.      Heart sounds: Normal heart sounds.   Pulmonary:      Effort: No accessory muscle usage. She is intubated.      Breath sounds: Rales present.   Abdominal:      General: Abdomen is flat. Bowel sounds are normal. There is no distension.      Tenderness: There is no abdominal tenderness. There is no  guarding.   Genitourinary:     Comments: Menard in place   Musculoskeletal:         General: Swelling present.      Cervical back: No rigidity.      Comments: Left upper extremity with chronic swelling  Right lower extremity swelling also noted   Skin:     General: Skin is warm and dry.      Capillary Refill: Capillary refill takes less than 2 seconds.   Neurological:      General: No focal deficit present.      Mental Status: She is alert.      Comments: Alert and following commands    Psychiatric:         Mood and Affect: Mood is anxious.         Behavior: Behavior is cooperative.       Vents:  Vent Mode: PRVC (12/31/21 0827)  Ventilator Initiated: Yes (12/30/21 2039)  Set Rate: 16 BPM (12/31/21 0827)  Vt Set: 400 mL (12/31/21 0827)  Pressure Support: 5 cmH20 (12/31/21 0936)  PEEP/CPAP: 5 cmH20 (12/31/21 0936)  Oxygen Concentration (%): 40 (12/31/21 0936)  Peak Airway Pressure: 11 cmH2O (12/31/21 0936)  Total Ve: 11.2 mL (12/31/21 0936)  F/VT Ratio<105 (RSBI): (!) 73.47 (12/31/21 0936)    Lines/Drains/Airways     Central Venous Catheter Line            Percutaneous Central Line Insertion/Assessment - Triple Lumen  12/30/21 2115 right internal jugular <1 day          Drain                 Urethral Catheter 12/30/21 2200 16 Fr. <1 day          Airway                 Airway - Non-Surgical 12/30/21 2000 Endotracheal Tube <1 day         Airway - Non-Surgical 12/30/21 2026 Endotracheal Tube <1 day          Peripheral Intravenous Line                 Peripheral IV - Single Lumen 12/30/21 1915 22 G Right Wrist <1 day         Peripheral IV - Single Lumen 12/30/21 2100 22 G Right Hand <1 day                Significant Labs:    CBC/Anemia Profile:  Recent Labs   Lab 12/30/21 1925 12/31/21  0234   WBC 16.68* 17.43*   HGB 11.3* 10.7*   HCT 34.9* 32.5*    302   MCV 99* 96   RDW 15.5* 14.9*        Chemistries:  Recent Labs   Lab 12/30/21 1925 12/31/21  0234 12/31/21  0241   * 129* 129*   K 5.6* 4.8 4.8   CL 98 94*  94*   CO2 19* 22* 23   BUN 35* 37* 37*   CREATININE 1.9* 1.9* 1.8*   CALCIUM 9.7 9.3 9.3   ALBUMIN 3.8 3.6  --    PROT 8.1 7.6  --    BILITOT 0.4 0.4  --    ALKPHOS 62 55  --    ALT 23 21  --    AST 24 24  --    MG 2.2 1.9  --    PHOS  --  3.7  --        All pertinent labs within the past 24 hours have been reviewed.    Significant Imaging:   I have reviewed all pertinent imaging results/findings within the past 24 hours.      ABG  Recent Labs   Lab 12/30/21  2158   PH 7.372   PO2 248*   PCO2 40.3   HCO3 23.4*   BE -2     Assessment/Plan:     * Acute hypoxemic respiratory failure  Respiratory failure likely 2/2 flash pulmonary edema + hypertensive emergency with possible pneumonia/pneumonitis. CXR with bilateral patchy opacities and pink frothy sputum noted on intubation. Initial BP 230s/100s. CT with left lower lobe consolidation. , procal 0.15.     -- able to quickly wean ventilatory support with blood pressure control  -- sputum sent; f/u culture results. CAP coverage for now  -- TTE in July with EF 60%; grade I DD. Diureses as tolerated   -- wean for SpO2 >90%  -- Passed SAT/SBT, extubate to vapotherm this morning    UTI (urinary tract infection)  UA appears infectious    -- follow up urine culture  -- continue rocephin     TANIA (acute kidney injury)  TANIA on admit.    -- Avoid ACEI/ARB/NSAIDS/Nephrotoxins.   -- Renally dose all meds  -- Menard in place; monitor I&Os      Breast CA  Breast Cancer with progression of metastatic disease noted in November. Follows with Dr. Duckworth at [facility].     -- palliative care consulted     Congestive heart failure  -- BP control   -- diurese as tolerated     Plan discussed with Dr. Kong.    Critical Care Time: 65 minutes  Critical care was time spent personally by me on the following activities: development of treatment plan with patient or surrogate and bedside caregivers, discussions with consultants, evaluation of patient's response to treatment, examination of  patient, ordering and performing treatments and interventions, ordering and review of laboratory studies, ordering and review of radiographic studies, pulse oximetry, re-evaluation of patient's condition. This critical care time did not overlap with that of any other provider or involve time for any procedures.    Thank you for your consult. I will follow-up with patient. Please contact us if you have any additional questions.     Li Baker NP  Pulmonology  [facility] - Intensive Care

## 2021-12-31 NOTE — HOSPITAL COURSE
Ms Vinita Andrews is a 86 y.o. woman with HFpEF, severe AS, HTN who presented with hypertensive emergency causing pulmonary edema causing acute hypoxic respiratory failure. She was intubated. Also noted to have leukocytosis with LLL infiltrate; started antibiotics. Also with TANIA and ESBL Ecoli UTI. Also known to have metastatic breast cancer. Extubated 12/31 after starting diuresis. Oxygen requirements improving. Dr Parks discussed code status and goals of care-- DNR and hospice at nursing home. Patient prefers oral antibiotics over IV antibiotics for urinary tract infection even if suboptimal treatment. Sensitivities for oral abx include augmentin and cipro. QTc prolonged therefore given augmentin (renally dosed) for the remainder of 7 days of treatment. Dr Desai went over code status and goals of care again with patient and still agreed with plan. Transportation and hospice to be arranged by social work. Was hypertensive and anxious given antihypertensives and home lorazepam PO prior to discharge.

## 2021-12-31 NOTE — ASSESSMENT & PLAN NOTE
CT with LLL infiltrate. Borderline fever, WBC 17  - follow up ET aspirate  - continue CTX, azithro

## 2021-12-31 NOTE — NURSING
Pt received from ED RN via stretcher. Pt transferred to low, locked bed; placed on continuous bedside cardiac, SpO2, and NIBP monitors. ETT, R IJ TLC, martino, OGT remain. Precedex and dopamine gtts infusing as ordered. Pt awake and following commands, trying to talk around ETT. VSS, new orders noted, will continue to monitor.

## 2021-12-31 NOTE — PROGRESS NOTES
[facility] Medicine  Progress Note    Patient Name: Vinita Andrews  MRN: [id number]  Patient Class: IP- Inpatient   Admission Date: 12/30/2021  Length of Stay: 1 days  Attending Physician: Chloe Parks MD  Primary Care Provider: Louis Burt MD        Subjective:     Principal Problem:Acute hypoxemic respiratory failure        HPI:  History from ED physician and nurse at bedside.  At time of my evaluation, patient had been intubated.    86 y.o. female with a past medical history of diabetes mellitus, breast cancer, postmastectomy lymphadema syndrome, and diastolic heart failure who presents to the Emergency Department via EMS from [facility] for evaluation of respiratory distress. Per EMS, patient's oxygen saturation was in the 65-70% range at the time of their intervention. Nursing staff reports that the patient may have aspirated while eating dinner. Patient denies any aspiration, choking, or vomiting. However, she states that she has had abdominal pain and nausea since 1300 today. She does not note any other associated symptoms.  There is concern for potential aspiration, although per ED doc patient denied any difficulty with eating today.  Patient experienced chest tightness.  Due to hypoxia and respiratory distress, patient was intubated in the emergency room and central line placed, placement was confirmed via x-ray. At time of intubation, frothy sputum was noted.  Initially patient was hypertensive at 230/100.  After hydralazine, systolic blood pressure was down to around 110. After intubation with sedation, systolic blood pressure transiently went down to 52. Dopamine drip was started and mm AP came up to around 65.  Levophed was ordered as next pressor to be added should blood pressures dropped again.    CT of the abdomen and pelvis was ordered given complaint of abdominal pain.  Patient received dose of ceftriaxone and azithromycin in the emergency  room.    Lab work significant for leukocytosis at 16 point 2, potassium 5.9, creatinine 1.9.  BNP at 748, baseline was 612 five months ago.  Troponin minimally elevated at 0.038.    At time of my evaluation pulse 85, respirations 20, /55 sign 95% on ventilator.    Patient admitted to ICU.  Cardiology consult for suspected flash pulmonary edema/CHF, pulmonology consulted for vent management.    He repeat BMP is pending to re-evaluate potassium as hemolysis is suspected.                 Overview/Hospital Course:  Ms Vinita Andrews is a 86 y.o. woman with HFpEF, severe AS, HTN who presented with hypertensive emergency causing pulmonary edema causing acute hypoxic respiratory failure. She was intubated. Also noted to have leukocytosis with LLL infiltrate; started CTX and azithromycin for potential pneumonia. Also with TANIA. Extubated 12/31 after starting diuresis.       Interval History: Intubated. Awake and alert, following commands, gesturing that she wants tube out. Family at bedside.     Review of Systems   Unable to perform ROS: Intubated     Objective:     Vital Signs (Most Recent):  Temp: 100.22 °F (37.9 °C) (12/31/21 1030)  Pulse: 75 (12/31/21 1030)  Resp: (!) 28 (12/31/21 1030)  BP: (!) 88/50 (12/31/21 1030)  SpO2: 95 % (12/31/21 1030) Vital Signs (24h Range):  Temp:  [95.9 °F (35.5 °C)-100.22 °F (37.9 °C)] 100.22 °F (37.9 °C)  Pulse:  [38-86] 75  Resp:  [17-43] 28  SpO2:  [89 %-100 %] 95 %  BP: ()/() 88/50     Weight: 73.6 kg (162 lb 4.1 oz)  Body mass index is 29.68 kg/m².    Intake/Output Summary (Last 24 hours) at 12/31/2021 1035  Last data filed at 12/31/2021 0927  Gross per 24 hour   Intake 618.18 ml   Output 2175 ml   Net -1556.82 ml      Physical Exam  Vitals and nursing note reviewed.   Constitutional:       General: She is not in acute distress.     Appearance: She is not toxic-appearing.   HENT:      Head: Normocephalic and atraumatic.      Nose:      Comments: NGT in place      Mouth/Throat:      Comments: ETT in place  Cardiovascular:      Rate and Rhythm: Normal rate and regular rhythm.      Pulses: Normal pulses.      Heart sounds: Normal heart sounds. No murmur heard.  No gallop.    Pulmonary:      Effort: Pulmonary effort is normal. No respiratory distress.      Breath sounds: Rales present. No wheezing.      Comments: On vent  Abdominal:      General: Bowel sounds are normal. There is distension.      Palpations: Abdomen is soft.      Tenderness: There is no abdominal tenderness. There is no guarding.   Genitourinary:     Comments: gunner  Musculoskeletal:         General: Deformity (to feet bilaterally) present.      Right lower leg: Edema present.      Left lower leg: Edema present.      Comments: L arm lymphedema. R>L lower extremity edema   Skin:     General: Skin is warm and dry.      Comments: PHILIP TLC   Neurological:      Mental Status: She is alert.      Comments: Follows commands         Significant Labs: All pertinent labs within the past 24 hours have been reviewed.    Significant Imaging: I have reviewed all pertinent imaging results/findings within the past 24 hours.      Assessment/Plan:      * Acute hypoxemic respiratory failure  Patient admitted with Hypoxic which is Acute.  she is not on home oxygen. Signs/symptoms of respiratory failure include- tachypnea and increased work of breathing present on admission.   - Labs and images were reviewed. Patient Has recent ABG, which has been reviewed..   - Supplemental oxygen was provided and noted- vapotherm   - Respiratory failure is due to- CHF and Pneumonia   - lasix diuresis for CHF  - continue CTX/azithro for PNA. Sputum culture pending      Hyponatremia  Related to volume overload and TANIA  - diuresis      Normocytic anemia  At baseline. No bleeding       Left lower lobe pneumonia  CT with LLL infiltrate. Borderline fever, WBC 17  - follow up ET aspirate  - continue CTX, azithro      Hypertensive emergency  BP to max  "225/89 on admit with pulmonary edema and respiratory failure. BP then became low and required vasopressors. Now off vasopressors.   - monitor BP  - hold BP Rx for now      TANIA (acute kidney injury)  Cr 1.8 on admit, worsening from baseline 0.9-1.2. Suspect due to HTN emergency + CHF exacerbation  - continue lasix diuresis      Severe aortic stenosis  TTE with severe AS  Patient has declined surgery and TAVR  Suspect she will continue to have issues with CHF exacerbations due to severe AS  Palliative care consulted       Secondary malignant neoplasm of bone  From breast cancer      Postmastectomy lymphedema syndrome  Affecting L arm      Hyperkalemia  Associated with TANIA. Improved from on admit after being given lasix       Elevated troponin  Suspect due to hypertensive emergency   Troponins are flat. Will stop trending  - continue asa, plavix, statin      Atrial fibrillation with rapid ventricular response  History of AFib  Not in RVR at this time        Hyperlipidemia  Lipids controlled  Continue statin      Metastatic breast cancer  Per Dr Duckworth note 11/2021: "L breast cancer with metastatic disease  - Promarily Skeletal Metastatic Disease associated with elevated Ca 27.29 initially  - [place] with hormonal therapy but residual flako disease in 14 of 16 nodes  - Progression after exemestane Oct 2016. Started Faslodex and Ibrance added.   - Intolerant to Ibrance (GI toxicity)  - Progression of disease while off treatment (while on hospice)Sept 2021  - Revoked Hospice Sept 2021  - Resumed Faslodex Sept 28, 2021  ? Pathologic Fracture T1  - s/p XRT"    Palliative care consulted       Type 2 diabetes mellitus with hyperglycemia  A1c:   Lab Results   Component Value Date    LABA1C 5.6 05/31/2018    HGBA1C 6.2 (H) 07/09/2021     Meds: SSI PRN to maintain goal 140-180  ADA diet if can tolerated, accuchecks, hypoglycemic protocol        Essential hypertension  With HTN emergency on admit, then became hypotensive  BP is now " normalized/low  Hold BP Rx        Acute on chronic diastolic congestive heart failure  Patient admitted with shortness of breath consistent with acute on chronic diastolic CHF. Cause of exacerbation is hypertensive emergency + severe AS    Last TTE 7/2021 showed EF 60%, G1DD, severe AS   BNP  Recent Labs   Lab 12/30/21  1925   *     CXR: pulmonary edema  Recent Labs   Lab 12/31/21  0842   TROPONINI 0.095*     Start on IV lasix diuresis. Monitor ins and outs  TTE pending   Cardiology follow up on discharge          VTE Risk Mitigation (From admission, onward)         Ordered     enoxaparin injection 30 mg  Daily         12/30/21 2314     IP VTE HIGH RISK PATIENT  Once         12/30/21 2314     Place sequential compression device  Until discontinued         12/30/21 2314                Discharge Planning   JUAN:      Code Status: Full Code   Is the patient medically ready for discharge?:     Reason for patient still in hospital (select all that apply): Patient trending condition               Critical care time spent on the evaluation and treatment of severe organ dysfunction, review of pertinent labs and imaging studies, discussions with consulting providers and discussions with patient/family: 45 minutes.      Chloe Parks MD  Department of Hospital Medicine   [facility] [place] - Intensive Care

## 2021-12-31 NOTE — PLAN OF CARE
Problem: Infection  Goal: Absence of Infection Signs and Symptoms  Outcome: Ongoing, Progressing     Problem: Adult Inpatient Plan of Care  Goal: Plan of Care Review  Outcome: Ongoing, Progressing  Goal: Patient-Specific Goal (Individualized)  Outcome: Ongoing, Progressing  Goal: Absence of Hospital-Acquired Illness or Injury  Outcome: Ongoing, Progressing  Goal: Optimal Comfort and Wellbeing  Outcome: Ongoing, Progressing  Goal: Readiness for Transition of Care  Outcome: Ongoing, Progressing     Problem: Diabetes Comorbidity  Goal: Blood Glucose Level Within Targeted Range  Outcome: Ongoing, Progressing     Problem: Skin and Tissue Injury (Artificial Airway)  Goal: Absence of Device-Related Skin or Tissue Injury  Outcome: Ongoing, Progressing

## 2021-12-31 NOTE — ASSESSMENT & PLAN NOTE
"Per Dr Duckworth note 11/2021: "L breast cancer with metastatic disease  - Promarily Skeletal Metastatic Disease associated with elevated Ca 27.29 initially  - [place] with hormonal therapy but residual flako disease in 14 of 16 nodes  - Progression after exemestane Oct 2016. Started Faslodex and Ibrance added.   - Intolerant to Ibrance (GI toxicity)  - Progression of disease while off treatment (while on hospice)Sept 2021  - Revoked Hospice Sept 2021  - Resumed Faslodex Sept 28, 2021  ? Pathologic Fracture T1  - s/p XRT"    Palliative care consulted     "

## 2021-12-31 NOTE — ASSESSMENT & PLAN NOTE
Patient is identified as having Diastolic (HFpEF) heart failure that is Acute on chronic. CHF is currently uncontrolled.  Hypertensive emergency on presentation.  Known severe aortic stenosis.  Latest ECHO performed and demonstrates- Results for orders placed during the hospital encounter of 07/09/21    Echo    Interpretation Summary  · The left ventricle is normal in size with concentric hypertrophy and normal systolic function.  · The estimated ejection fraction is 60%.  · Grade I left ventricular diastolic dysfunction.  · Normal right ventricular size with normal right ventricular systolic function.  · Moderate left atrial enlargement.  · There is severe aortic valve stenosis.  · Aortic valve area is 0.77 cm2; peak velocity is 4.58 m/s; mean gradient is 51 mmHg.  · Mild aortic regurgitation.  · Normal central venous pressure (3 mmHg).  · The estimated PA systolic pressure is 29 mmHg.  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 4.0 cm  . Continue Beta Blocker and ACE/ARB and monitor clinical status closely. Monitor on telemetry. Patient is on CHF pathway.  Monitor strict Is&Os and daily weights.  Place on fluid restriction of 1.5 L. Continue to stress to patient importance of self efficacy and  on diet for CHF. Last BNP reviewed- and noted below   Recent Labs   Lab 12/30/21 1925   *   .  12/31/2021:  Continue diuresis.  Blood pressure control.  Avoid excessive afterload reduction.

## 2021-12-31 NOTE — HPI
Ms. Vinita Andrews is an 87 yo female with a PMHx of breast cancer with mets to bone and liver, postmastectomy lymphadema syndrome, diabetes mellitus, and diastolic heart failure who presents to the Emergency Department via EMS from [facility] for evaluation of respiratory distress. Per EMS, patient's oxygen saturation was in the 65-70% range at the time of their intervention. Nursing staff reports that the patient may have aspirated while eating dinner. Patient denies any aspiration, choking, or vomiting; however, she states that she has had abdominal pain and nausea since 1300 today. She does not note any other associated symptoms. Patient experienced chest tightness. Due to hypoxia and respiratory distress, patient was intubated in the emergency room and central line placed. At time of intubation, frothy sputum was noted.  Initially patient was hypertensive at 230/100.  After hydralazine, systolic blood pressure was down to around 110. After intubation with sedation, systolic blood pressure transiently went down to 52. Dopamine drip was started and MAP came up to around 65. History obtained from chart review as she was intubated for my exam.    Pulmonary was consulted for ventilator management.

## 2021-12-31 NOTE — ASSESSMENT & PLAN NOTE
Patient admitted with Hypoxic which is Acute.  she is not on home oxygen. Signs/symptoms of respiratory failure include- tachypnea and increased work of breathing present on admission.   - Labs and images were reviewed. Patient Has recent ABG, which has been reviewed..   - Supplemental oxygen was provided and noted- vapotherm   - Respiratory failure is due to- CHF and Pneumonia   - lasix diuresis for CHF  - continue CTX/azithro for PNA. Sputum culture pending

## 2021-12-31 NOTE — PLAN OF CARE
[facility]  ICU Shift Summary  Date: 12/31/2021      COVID Test: (--)  Isolation: No active isolations     Prehospitalization: Nursing Home  Admit Date / LOS : 12/30/2021/ 1 days    Diagnosis:   Congestive heart failure    Consults:        Active:        Needed: Cards, Pulm     Code Status: Full Code   Advanced Directive: <no information>    LDA: Menard, OG, TLC, and Vent       Central Lines/Site/Justification:Pressors and Multiple GTTS       Urinary Cath/Order/Justification:Critically Ill in the ICU and requiring intensive monitoring    Vasopressors/Infusions:    DOPamine Stopped (12/31/21 0402)    NORepinephrine bitartrate-D5W 0.05 mcg/kg/min (12/31/21 0500)    propofoL 15 mcg/kg/min (12/31/21 0500)          GOALS: Volume/ Hemodynamic: MAP >65                     RASS: -2  light sedation, briefly awakes to voice (eye opening)    Pain Management: IV       Pain Controlled: yes     Rhythm: NSR    Respiratory Device: Vent    Vent Mode: PRVC  Oxygen Concentration (%):  [] 50  Resp Rate Total:  [16 br/min-37 br/min] 19 br/min  Vt Set:  [400 mL] 400 mL  PEEP/CPAP:  [5 cmH20] 5 cmH20  Mean Airway Pressure:  [11 vcF82-80.5 cmH20] 11 cmH20             Most Recent SBT/ SAT: Did not perform       MOVE Screen: FAIL  ICU Liberation: no    VTE Prophylaxis: Pharm  Mobility: Bedrest  Stress Ulcer Prophylaxis: Yes    Dietary: NPO  Tolerance: not applicable  Advancement: no    I & O (24h):    Intake/Output Summary (Last 24 hours) at 12/31/2021 0516  Last data filed at 12/31/2021 0500  Gross per 24 hour   Intake 495.57 ml   Output 1575 ml   Net -1079.43 ml        Restraints: Yes    Significant Dates:  Post Op Date: N/A  Rescue Date: N/A  Imaging/ Diagnostics: N/A    Noteworthy Labs:  none    CBC/Anemia Labs: Coags:    Recent Labs   Lab 12/30/21  1925 12/31/21  0234   WBC 16.68* 17.43*   HGB 11.3* 10.7*   HCT 34.9* 32.5*    302   MCV 99* 96   RDW 15.5* 14.9*    No results for input(s): PT, INR, APTT in the  last 168 hours.     Chemistries:   Recent Labs   Lab 12/30/21 1925 12/30/21 1925 12/31/21 0234 12/31/21  0241   *   < > 129* 129*   K 5.6*   < > 4.8 4.8   CL 98   < > 94* 94*   CO2 19*   < > 22* 23   BUN 35*   < > 37* 37*   CREATININE 1.9*   < > 1.9* 1.8*   CALCIUM 9.7   < > 9.3 9.3   PROT 8.1  --  7.6  --    BILITOT 0.4  --  0.4  --    ALKPHOS 62  --  55  --    ALT 23  --  21  --    AST 24  --  24  --    MG 2.2  --  1.9  --    PHOS  --   --  3.7  --     < > = values in this interval not displayed.        Cardiac Enzymes: Ejection Fractions:    Recent Labs     12/30/21 1925 12/31/21 0234   TROPONINI 0.038* 0.077*    EF   Date Value Ref Range Status   07/09/2021 60 % Final        POCT Glucose: HbA1c:    No results for input(s): POCTGLUCOSE in the last 168 hours. Hemoglobin A1C   Date Value Ref Range Status   07/09/2021 6.2 (H) 4.0 - 5.6 % Final     Comment:     ADA Screening Guidelines:  5.7-6.4%  Consistent with prediabetes  >or=6.5%  Consistent with diabetes    High levels of fetal hemoglobin interfere with the HbA1C  assay. Heterozygous hemoglobin variants (HbS, HgC, etc)do  not significantly interfere with this assay.   However, presence of multiple variants may affect accuracy.             ICU LOS 4h  Level of Care: Critical Care    Chart Check: 24 HR Done  Shift Summary/Plan for the shift: Pt remains free from falls and injuries. Vent, TLC, martino, OGT remain. Prop and levo gtts infusing and titrated as ordered. BG checked, supplemental insulin given as ordered. VSS, no acute issues overnight.

## 2021-12-31 NOTE — ASSESSMENT & PLAN NOTE
Actually hypotensive  Continue dobutamine titrate to MA P greater than 65  Start Levophed GTT p.r.n.

## 2021-12-31 NOTE — PROGRESS NOTES
Pharmacist Renal Dose Adjustment Note    Vinita Andrews is a 86 y.o. female being treated with the medication famotidine    Patient Data:    Vital Signs (Most Recent):  Temp: 96.08 °F (35.6 °C) (12/31/21 0115)  Pulse: 72 (12/31/21 0115)  Resp: 17 (12/31/21 0115)  BP: 135/61 (12/31/21 0115)  SpO2: 98 % (12/31/21 0115) Vital Signs (72h Range):  Temp:  [95.9 °F (35.5 °C)-96.5 °F (35.8 °C)]   Pulse:  [38-86]   Resp:  [17-43]   BP: ()/()   SpO2:  [89 %-100 %]      Recent Labs   Lab 12/30/21 1925   CREATININE 1.9*     Serum creatinine: 1.9 mg/dL (H) 12/30/21 1925  Estimated creatinine clearance: 20.3 mL/min (A)    Medication: famotidine 20 mg IV q12h will be changed to famotidine 20 mg IV daily    Pharmacist's Name: Katalina Mccormack  Pharmacist's Extension: 2048

## 2021-12-31 NOTE — ASSESSMENT & PLAN NOTE
Given findings on chest x-ray of pulmonary congestion and frothy sputum noted at time of intubation, start Lasix 40 mg IV b.i.d.  Echocardiogram on 07/09/2021 with EF of 60%, therefore do not want to be too aggressive with diuresis as likely preload dependent  Cardiology consulted  Intubated in ICU  Serial troponins

## 2021-12-31 NOTE — SUBJECTIVE & OBJECTIVE
Past Medical History:   Diagnosis Date    Allergy     Anxiety     Arthritis     Cancer     Diabetes mellitus, type 2     Hyperlipidemia     Hypertension        Past Surgical History:   Procedure Laterality Date    APPENDECTOMY      CHOLECYSTECTOMY         Review of patient's allergies indicates:   Allergen Reactions    Ciprofloxacin hcl Nausea Only       Family History     Problem Relation (Age of Onset)    No Known Problems Mother    Tuberculosis Father        Tobacco Use    Smoking status: Former Smoker     Quit date: 9/15/1975     Years since quittin.3    Smokeless tobacco: Not on file   Substance and Sexual Activity    Alcohol use: Yes     Alcohol/week: 2.0 standard drinks     Types: 2 Glasses of wine per week    Drug use: No    Sexual activity: Never         Review of Systems   Unable to perform ROS: Intubated     Objective:     Vital Signs (Most Recent):  Temp: 99.86 °F (37.7 °C) (21)  Pulse: 78 (21)  Resp: (!) 36 (21)  BP: (!) 98/53 (21)  SpO2: 99 % (21) Vital Signs (24h Range):  Temp:  [95.9 °F (35.5 °C)-99.86 °F (37.7 °C)] 99.86 °F (37.7 °C)  Pulse:  [38-86] 78  Resp:  [17-43] 36  SpO2:  [89 %-100 %] 99 %  BP: ()/() 98/53     Weight: 73.6 kg (162 lb 4.1 oz)  Body mass index is 29.68 kg/m².      Intake/Output Summary (Last 24 hours) at 2021 0946  Last data filed at 2021 0927  Gross per 24 hour   Intake 618.18 ml   Output 2175 ml   Net -1556.82 ml       Physical Exam  Constitutional:       General: She is not in acute distress.     Appearance: She is ill-appearing.      Interventions: She is intubated and restrained.   HENT:      Head: Normocephalic and atraumatic.      Mouth/Throat:      Mouth: Mucous membranes are moist.      Pharynx: Oropharynx is clear.   Eyes:      General: No scleral icterus.     Pupils: Pupils are equal, round, and reactive to light.   Cardiovascular:      Rate and Rhythm: Normal rate and  regular rhythm.      Pulses: Normal pulses.      Heart sounds: Normal heart sounds.   Pulmonary:      Effort: No accessory muscle usage. She is intubated.      Breath sounds: Rales present.   Abdominal:      General: Abdomen is flat. Bowel sounds are normal. There is no distension.      Tenderness: There is no abdominal tenderness. There is no guarding.   Genitourinary:     Comments: Menard in place   Musculoskeletal:         General: Swelling present.      Cervical back: No rigidity.      Comments: Left upper extremity with chronic swelling  Right lower extremity swelling also noted   Skin:     General: Skin is warm and dry.      Capillary Refill: Capillary refill takes less than 2 seconds.   Neurological:      General: No focal deficit present.      Mental Status: She is alert.      Comments: Alert and following commands    Psychiatric:         Mood and Affect: Mood is anxious.         Behavior: Behavior is cooperative.       Vents:  Vent Mode: PRVC (12/31/21 0827)  Ventilator Initiated: Yes (12/30/21 2039)  Set Rate: 16 BPM (12/31/21 0827)  Vt Set: 400 mL (12/31/21 0827)  Pressure Support: 5 cmH20 (12/31/21 0936)  PEEP/CPAP: 5 cmH20 (12/31/21 0936)  Oxygen Concentration (%): 40 (12/31/21 0936)  Peak Airway Pressure: 11 cmH2O (12/31/21 0936)  Total Ve: 11.2 mL (12/31/21 0936)  F/VT Ratio<105 (RSBI): (!) 73.47 (12/31/21 0936)    Lines/Drains/Airways     Central Venous Catheter Line            Percutaneous Central Line Insertion/Assessment - Triple Lumen  12/30/21 2115 right internal jugular <1 day          Drain                 Urethral Catheter 12/30/21 2200 16 Fr. <1 day          Airway                 Airway - Non-Surgical 12/30/21 2000 Endotracheal Tube <1 day         Airway - Non-Surgical 12/30/21 2026 Endotracheal Tube <1 day          Peripheral Intravenous Line                 Peripheral IV - Single Lumen 12/30/21 1915 22 G Right Wrist <1 day         Peripheral IV - Single Lumen 12/30/21 2100 22 G Right Hand  <1 day                Significant Labs:    CBC/Anemia Profile:  Recent Labs   Lab 12/30/21 1925 12/31/21  0234   WBC 16.68* 17.43*   HGB 11.3* 10.7*   HCT 34.9* 32.5*    302   MCV 99* 96   RDW 15.5* 14.9*        Chemistries:  Recent Labs   Lab 12/30/21 1925 12/31/21 0234 12/31/21  0241   * 129* 129*   K 5.6* 4.8 4.8   CL 98 94* 94*   CO2 19* 22* 23   BUN 35* 37* 37*   CREATININE 1.9* 1.9* 1.8*   CALCIUM 9.7 9.3 9.3   ALBUMIN 3.8 3.6  --    PROT 8.1 7.6  --    BILITOT 0.4 0.4  --    ALKPHOS 62 55  --    ALT 23 21  --    AST 24 24  --    MG 2.2 1.9  --    PHOS  --  3.7  --        All pertinent labs within the past 24 hours have been reviewed.    Significant Imaging:   I have reviewed all pertinent imaging results/findings within the past 24 hours.   0

## 2021-12-31 NOTE — ASSESSMENT & PLAN NOTE
Suspect due to hypertensive emergency   Troponins are flat. Will stop trending  - continue asa, plavix, statin

## 2021-12-31 NOTE — HPI
History from ED physician and nurse at bedside.  At time of my evaluation, patient had been intubated.    86 y.o. female with a past medical history of diabetes mellitus, breast cancer, postmastectomy lymphadema syndrome, and diastolic heart failure who presents to the Emergency Department via EMS from [facility] for evaluation of respiratory distress. Per EMS, patient's oxygen saturation was in the 65-70% range at the time of their intervention. Nursing staff reports that the patient may have aspirated while eating dinner. Patient denies any aspiration, choking, or vomiting. However, she states that she has had abdominal pain and nausea since 1300 today. She does not note any other associated symptoms.  There is concern for potential aspiration, although per ED doc patient denied any difficulty with eating today.  Patient experienced chest tightness.  Due to hypoxia and respiratory distress, patient was intubated in the emergency room and central line placed, placement was confirmed via x-ray. At time of intubation, frothy sputum was noted.  Initially patient was hypertensive at 230/100.  After hydralazine, systolic blood pressure was down to around 110. After intubation with sedation, systolic blood pressure transiently went down to 52. Dopamine drip was started and mm AP came up to around 65.  Levophed was ordered as next pressor to be added should blood pressures dropped again.    CT of the abdomen and pelvis was ordered given complaint of abdominal pain.  Patient received dose of ceftriaxone and azithromycin in the emergency room.    Lab work significant for leukocytosis at 16 point 2, potassium 5.9, creatinine 1.9.  BNP at 748, baseline was 612 five months ago.  Troponin minimally elevated at 0.038.    At time of my evaluation pulse 85, respirations 20, /55 sign 95% on ventilator.    - Abad Phelps MD    Patient admitted to ICU.  Cardiology consult for suspected flash pulmonary edema/CHF,  pulmonology consulted for vent management.    He repeat BMP is pending to re-evaluate potassium as hemolysis is suspected.

## 2021-12-31 NOTE — SUBJECTIVE & OBJECTIVE
Past Medical History:   Diagnosis Date    Allergy     Anxiety     Arthritis     Cancer     Diabetes mellitus, type 2     Hyperlipidemia     Hypertension        Past Surgical History:   Procedure Laterality Date    APPENDECTOMY      CHOLECYSTECTOMY         Review of patient's allergies indicates:   Allergen Reactions    Ciprofloxacin hcl Nausea Only       No current facility-administered medications on file prior to encounter.     Current Outpatient Medications on File Prior to Encounter   Medication Sig    albuterol-ipratropium (DUO-NEB) 2.5 mg-0.5 mg/3 mL nebulizer solution Take 3 mLs by nebulization every 6 (six) hours as needed for Wheezing. Rescue    alendronate (FOSAMAX) 70 MG tablet TAKE 1 TABLET BY MOUTH EVERY WEEK    amLODIPine (NORVASC) 5 MG tablet TAKE 1 TABLET(5 MG) BY MOUTH EVERY DAY    aspirin (ECOTRIN) 81 MG EC tablet Take 1 tablet (81 mg total) by mouth once daily.    atorvastatin (LIPITOR) 20 MG tablet Take 1 tablet (20 mg total) by mouth once daily.    calcium carbonate-vitamin D3 (CALCIUM 600 WITH VITAMIN D3) 600 mg(1,500mg) -400 unit Chew Take 1 tablet by mouth.    clopidogreL (PLAVIX) 75 mg tablet Take 1 tablet (75 mg total) by mouth once daily.    exemestane (AROMASIN) 25 mg tablet Take 25 mg by mouth once daily.     furosemide (LASIX) 20 MG tablet Take 1 tablet (20 mg total) by mouth once daily.    gabapentin (NEURONTIN) 100 MG capsule TAKE 1 CAPSULE(100 MG) BY MOUTH THREE TIMES DAILY    glipiZIDE (GLUCOTROL) 2.5 MG TR24 TAKE 1 TABLET(2.5 MG) BY MOUTH EVERY DAY    irbesartan (AVAPRO) 150 MG tablet TAKE 1 TABLET(150 MG) BY MOUTH EVERY DAY    medical supply, miscellaneous (MISCELLANEOUS MEDICAL SUPPLY [facility]) Compression sleeve; apply daily    metoprolol succinate (TOPROL-XL) 25 MG 24 hr tablet Take 25 mg by mouth.    nystatin-triamcinolone (MYCOLOG II) cream APPLY EXTERNALLY TO THE AFFECTED AREA EVERY DAY    omeprazole (PRILOSEC) 20 MG capsule TAKE 1 CAPSULE BY MOUTH  TWICE DAILY AS NEEDED.    triamcinolone acetonide 0.1% (KENALOG) 0.1 % cream Apply topically 3 (three) times daily.     Family History     Problem Relation (Age of Onset)    No Known Problems Mother    Tuberculosis Father        Tobacco Use    Smoking status: Former Smoker     Quit date: 9/15/1975     Years since quittin.3    Smokeless tobacco: Not on file   Substance and Sexual Activity    Alcohol use: Yes     Alcohol/week: 2.0 standard drinks     Types: 2 Glasses of wine per week    Drug use: No    Sexual activity: Never     Review of Systems   Unable to perform ROS: intubated     Objective:     Vital Signs (Most Recent):  Temp: 99.86 °F (37.7 °C) (21)  Pulse: 78 (21)  Resp: (!) 24 (21)  BP: (!) 86/52 (21)  SpO2: 97 % (21) Vital Signs (24h Range):  Temp:  [95.9 °F (35.5 °C)-99.86 °F (37.7 °C)] 99.86 °F (37.7 °C)  Pulse:  [38-86] 78  Resp:  [17-43] 24  SpO2:  [89 %-100 %] 97 %  BP: ()/() 86/     Weight: 73.6 kg (162 lb 4.1 oz)  Body mass index is 29.68 kg/m².    SpO2: 97 %  O2 Device (Oxygen Therapy): Vapotherm      Intake/Output Summary (Last 24 hours) at 2021 1002  Last data filed at 2021 0927  Gross per 24 hour   Intake 618.18 ml   Output 2175 ml   Net -1556.82 ml       Lines/Drains/Airways     Central Venous Catheter Line            Percutaneous Central Line Insertion/Assessment - Triple Lumen  21 right internal jugular <1 day          Drain                 Urethral Catheter 210 16 Fr. <1 day          Airway                 Airway - Non-Surgical 21 Endotracheal Tube <1 day         Airway - Non-Surgical 21 Endotracheal Tube <1 day          Peripheral Intravenous Line                 Peripheral IV - Single Lumen 21 2100 22 G Right Hand <1 day                Physical Exam  Vitals reviewed.   Constitutional:       General: She is not in acute distress.     Appearance: She is  not diaphoretic.   Neck:      Vascular: No carotid bruit or JVD.   Cardiovascular:      Rate and Rhythm: Normal rate and regular rhythm.      Pulses: Normal pulses.      Heart sounds: Murmur heard.    Harsh crescendo midsystolic murmur is present with a grade of 3/6 at the upper right sternal border radiating to the neck.      Pulmonary:      Breath sounds: Decreased air movement present.   Abdominal:      General: Bowel sounds are normal.      Palpations: Abdomen is soft.      Tenderness: There is no abdominal tenderness.   Musculoskeletal:      Right lower leg: No edema.      Left lower leg: No edema.   Neurological:      Mental Status: She is alert.   Psychiatric:         Mood and Affect: Mood and affect normal.         Significant Labs:   CMP   Recent Labs   Lab 12/30/21 1925 12/31/21 0234 12/31/21  0241   * 129* 129*   K 5.6* 4.8 4.8   CL 98 94* 94*   CO2 19* 22* 23   * 229* 230*   BUN 35* 37* 37*   CREATININE 1.9* 1.9* 1.8*   CALCIUM 9.7 9.3 9.3   PROT 8.1 7.6  --    ALBUMIN 3.8 3.6  --    BILITOT 0.4 0.4  --    ALKPHOS 62 55  --    AST 24 24  --    ALT 23 21  --    ANIONGAP 12 13 12   ESTGFRAFRICA 27* 27* 29*   EGFRNONAA 24* 24* 25*   , CBC   Recent Labs   Lab 12/30/21 1925 12/30/21 1925 12/31/21  0234   WBC 16.68*  --  17.43*   HGB 11.3*  --  10.7*   HCT 34.9*   < > 32.5*     --  302    < > = values in this interval not displayed.   , Lipid Panel No results for input(s): CHOL, HDL, LDLCALC, TRIG, CHOLHDL in the last 48 hours. and Troponin   Recent Labs   Lab 12/30/21 1925 12/31/21 0234 12/31/21  0842   TROPONINI 0.038* 0.077* 0.095*       Significant Imaging: Echocardiogram:   Transthoracic echo (TTE) complete (Cupid Only):   Results for orders placed or performed during the hospital encounter of 07/09/21   Echo   Result Value Ref Range    BSA 1.86 m2    TDI SEPTAL 0.03 m/s    LV LATERAL E/E' RATIO 33.50 m/s    LV SEPTAL E/E' RATIO 44.67 m/s    LA WIDTH 5.17 cm    AORTIC VALVE CUSP  SEPERATION 1.18 cm    TDI LATERAL 0.04 m/s    PV PEAK VELOCITY 1.11 cm/s    LVIDd 3.18 (A) 3.5 - 6.0 cm    IVS 2.01 (A) 0.6 - 1.1 cm    Posterior Wall 1.96 (A) 0.6 - 1.1 cm    Ao root annulus 4.41 cm    LVIDs 2.17 2.1 - 4.0 cm    FS 32 28 - 44 %    LA volume 81.33 cm3    Sinus 4.04 cm    STJ 3.17 cm    Ascending aorta 3.28 cm    LV mass 277.96 g    LA size 2.75 cm    RVDD 3.72 cm    TAPSE 1.29 cm    RV S' 7.78 cm/s    Left Ventricle Relative Wall Thickness 1.23 cm    AV mean gradient 51 mmHg    AV valve area 0.77 cm2    AV Velocity Ratio 0.29     AV index (prosthetic) 0.26     MV mean gradient 1 mmHg    MV valve area p 1/2 method 2.00 cm2    E/A ratio 0.75     Mean e' 0.04 m/s    E wave deceleration time 380.12 msec    IVRT 113.80 msec    Pulm vein S/D ratio 0.88     LVOT diameter 1.96 cm    LVOT area 3.0 cm2    LVOT peak kenney 1.31 m/s    LVOT peak VTI 27.75 cm    Ao peak kenney 4.58 m/s    Ao .99 cm    LVOT stroke volume 83.68 cm3    AV peak gradient 84 mmHg    MV peak gradient 15 mmHg    E/E' ratio 38.29 m/s    MV Peak E Kenney 1.34 m/s    TR Max Kenney 2.56 m/s    MV stenosis pressure 1/2 time 110.23 ms    MV Peak A Kenney 1.78 m/s    PV Peak S Kenney 0.29 m/s    PV Peak D Kenney 0.33 m/s    LV Systolic Volume 15.74 mL    LV Systolic Volume Index 8.7 mL/m2    LV Diastolic Volume 40.39 mL    LV Diastolic Volume Index 22.31 mL/m2    LA Volume Index 44.9 mL/m2    LV Mass Index 154 g/m2    RA Major Axis 5.90 cm    Left Atrium Minor Axis 6.73 cm    Left Atrium Major Axis 6.73 cm    Triscuspid Valve Regurgitation Peak Gradient 26 mmHg    RA Width 2.75 cm    Right Atrial Pressure (from IVC) 3 mmHg    EF 60 %    TV rest pulmonary artery pressure 29 mmHg    Narrative    · The left ventricle is normal in size with concentric hypertrophy and   normal systolic function.  · The estimated ejection fraction is 60%.  · Grade I left ventricular diastolic dysfunction.  · Normal right ventricular size with normal right ventricular systolic    function.  · Moderate left atrial enlargement.  · There is severe aortic valve stenosis.  · Aortic valve area is 0.77 cm2; peak velocity is 4.58 m/s; mean gradient   is 51 mmHg.  · Mild aortic regurgitation.  · Normal central venous pressure (3 mmHg).  · The estimated PA systolic pressure is 29 mmHg.  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 4.0 cm

## 2021-12-31 NOTE — SUBJECTIVE & OBJECTIVE
Past Medical History:   Diagnosis Date    Allergy     Anxiety     Arthritis     Cancer     Diabetes mellitus, type 2     Hyperlipidemia     Hypertension        Past Surgical History:   Procedure Laterality Date    APPENDECTOMY      CHOLECYSTECTOMY         Review of patient's allergies indicates:   Allergen Reactions    Ciprofloxacin hcl Nausea Only       No current facility-administered medications on file prior to encounter.     Current Outpatient Medications on File Prior to Encounter   Medication Sig    albuterol-ipratropium (DUO-NEB) 2.5 mg-0.5 mg/3 mL nebulizer solution Take 3 mLs by nebulization every 6 (six) hours as needed for Wheezing. Rescue    alendronate (FOSAMAX) 70 MG tablet TAKE 1 TABLET BY MOUTH EVERY WEEK    amLODIPine (NORVASC) 5 MG tablet TAKE 1 TABLET(5 MG) BY MOUTH EVERY DAY    aspirin (ECOTRIN) 81 MG EC tablet Take 1 tablet (81 mg total) by mouth once daily.    atorvastatin (LIPITOR) 20 MG tablet Take 1 tablet (20 mg total) by mouth once daily.    calcium carbonate-vitamin D3 (CALCIUM 600 WITH VITAMIN D3) 600 mg(1,500mg) -400 unit Chew Take 1 tablet by mouth.    clopidogreL (PLAVIX) 75 mg tablet Take 1 tablet (75 mg total) by mouth once daily.    exemestane (AROMASIN) 25 mg tablet Take 25 mg by mouth once daily.     furosemide (LASIX) 20 MG tablet Take 1 tablet (20 mg total) by mouth once daily.    gabapentin (NEURONTIN) 100 MG capsule TAKE 1 CAPSULE(100 MG) BY MOUTH THREE TIMES DAILY    glipiZIDE (GLUCOTROL) 2.5 MG TR24 TAKE 1 TABLET(2.5 MG) BY MOUTH EVERY DAY    irbesartan (AVAPRO) 150 MG tablet TAKE 1 TABLET(150 MG) BY MOUTH EVERY DAY    medical supply, miscellaneous (MISCELLANEOUS MEDICAL SUPPLY [facility] [place] Compression sleeve; apply daily    metoprolol succinate (TOPROL-XL) 25 MG 24 hr tablet Take 25 mg by mouth.    nystatin-triamcinolone (MYCOLOG II) cream APPLY EXTERNALLY TO THE AFFECTED AREA EVERY DAY    omeprazole (PRILOSEC) 20 MG capsule TAKE 1 CAPSULE BY MOUTH  TWICE DAILY AS NEEDED.    triamcinolone acetonide 0.1% (KENALOG) 0.1 % cream Apply topically 3 (three) times daily.     Family History     Problem Relation (Age of Onset)    No Known Problems Mother    Tuberculosis Father        Tobacco Use    Smoking status: Former Smoker     Quit date: 9/15/1975     Years since quittin.3    Smokeless tobacco: Not on file   Substance and Sexual Activity    Alcohol use: Yes     Alcohol/week: 2.0 standard drinks     Types: 2 Glasses of wine per week    Drug use: No    Sexual activity: Never     Review of Systems   Unable to perform ROS: Intubated     Objective:     Vital Signs (Most Recent):  Temp: 96 °F (35.6 °C) (21)  Pulse: 78 (21)  Resp: 18 (21)  BP: (!) 107/59 (21)  SpO2: 95 % (21) Vital Signs (24h Range):  Temp:  [96 °F (35.6 °C)-96.5 °F (35.8 °C)] 96 °F (35.6 °C)  Pulse:  [38-86] 78  Resp:  [18-43] 18  SpO2:  [89 %-100 %] 95 %  BP: ()/() 107/59     Weight: 73.5 kg (162 lb)  Body mass index is 29.63 kg/m².    Physical Exam    General:  Spontaneously opening eyes.  Intubated.    HEENT:  Normocephalic  Cardiovascular:  Tachycardic, murmur noted.  No lower extremity edema.  Pulmonary:  Transmitted ventilator sounds  Abdomen:  Soft, mildly tender to palpation, nondistended.  No guarding.  No rebound.  Negative Jackson's.  Positive bowel sounds.  Extremity:  Moves all extremities equally.  Dermatology:  No rashes appreciated on exposed skin  Psychiatric:  Intubated       Significant Labs:   All pertinent labs within the past 24 hours have been reviewed.  A1C:   Recent Labs   Lab 21  0551   HGBA1C 6.2*     CBC:   Recent Labs   Lab 21   WBC 16.68*   HGB 11.3*   HCT 34.9*        CMP:   Recent Labs   Lab 21   *   K 5.6*   CL 98   CO2 19*   *   BUN 35*   CREATININE 1.9*   CALCIUM 9.7   PROT 8.1   ALBUMIN 3.8   BILITOT 0.4   ALKPHOS 62   AST 24   ALT 23   ANIONGAP  12   EGFRNONAA 24*     Cardiac Markers:   Recent Labs   Lab 12/30/21 1925   *     Lactic Acid: No results for input(s): LACTATE in the last 48 hours.  Lipid Panel: No results for input(s): CHOL, HDL, LDLCALC, TRIG, CHOLHDL in the last 48 hours.  Magnesium:   Recent Labs   Lab 12/30/21 1925   MG 2.2     Troponin:   Recent Labs   Lab 12/30/21 1925   TROPONINI 0.038*     TSH:   Recent Labs   Lab 07/09/21  0551   TSH 6.759*       Significant Imaging: I have reviewed all pertinent imaging results/findings within the past 24 hours.

## 2021-12-31 NOTE — ASSESSMENT & PLAN NOTE
Breast Cancer with progression of metastatic disease noted in November. Follows with Dr. Duckworth at [facility].     -- palliative care consulted

## 2021-12-31 NOTE — ASSESSMENT & PLAN NOTE
Respiratory failure likely 2/2 flash pulmonary edema + hypertensive emergency with possible pneumonia/pneumonitis. CXR with bilateral patchy opacities and pink frothy sputum noted on intubation. Initial BP 230s/100s. CT with left lower lobe consolidation. , procal 0.15.     -- able to quickly wean ventilatory support with blood pressure control  -- sputum sent; f/u culture results. CAP coverage for now  -- TTE in July with EF 60%; grade I DD. Diureses as tolerated   -- wean for SpO2 >90%  -- Passed SAT/SBT, extubate to vapotherm this morning

## 2021-12-31 NOTE — SUBJECTIVE & OBJECTIVE
Interval History: Intubated. Awake and alert, following commands, gesturing that she wants tube out. Family at bedside.     Review of Systems   Unable to perform ROS: Intubated     Objective:     Vital Signs (Most Recent):  Temp: 100.22 °F (37.9 °C) (12/31/21 1030)  Pulse: 75 (12/31/21 1030)  Resp: (!) 28 (12/31/21 1030)  BP: (!) 88/50 (12/31/21 1030)  SpO2: 95 % (12/31/21 1030) Vital Signs (24h Range):  Temp:  [95.9 °F (35.5 °C)-100.22 °F (37.9 °C)] 100.22 °F (37.9 °C)  Pulse:  [38-86] 75  Resp:  [17-43] 28  SpO2:  [89 %-100 %] 95 %  BP: ()/() 88/50     Weight: 73.6 kg (162 lb 4.1 oz)  Body mass index is 29.68 kg/m².    Intake/Output Summary (Last 24 hours) at 12/31/2021 1035  Last data filed at 12/31/2021 0927  Gross per 24 hour   Intake 618.18 ml   Output 2175 ml   Net -1556.82 ml      Physical Exam  Vitals and nursing note reviewed.   Constitutional:       General: She is not in acute distress.     Appearance: She is not toxic-appearing.   HENT:      Head: Normocephalic and atraumatic.      Nose:      Comments: NGT in place     Mouth/Throat:      Comments: ETT in place  Cardiovascular:      Rate and Rhythm: Normal rate and regular rhythm.      Pulses: Normal pulses.      Heart sounds: Normal heart sounds. No murmur heard.  No gallop.    Pulmonary:      Effort: Pulmonary effort is normal. No respiratory distress.      Breath sounds: Rales present. No wheezing.      Comments: On vent  Abdominal:      General: Bowel sounds are normal. There is distension.      Palpations: Abdomen is soft.      Tenderness: There is no abdominal tenderness. There is no guarding.   Genitourinary:     Comments: gunner  Musculoskeletal:         General: Deformity (to feet bilaterally) present.      Right lower leg: Edema present.      Left lower leg: Edema present.      Comments: L arm lymphedema. R>L lower extremity edema   Skin:     General: Skin is warm and dry.      Comments: RIJ TLC   Neurological:      Mental Status:  She is alert.      Comments: Follows commands         Significant Labs: All pertinent labs within the past 24 hours have been reviewed.    Significant Imaging: I have reviewed all pertinent imaging results/findings within the past 24 hours.

## 2022-01-01 LAB
ANION GAP SERPL CALC-SCNC: 10 MMOL/L (ref 8–16)
BASOPHILS # BLD AUTO: 0.03 K/UL (ref 0–0.2)
BASOPHILS NFR BLD: 0.3 % (ref 0–1.9)
BUN SERPL-MCNC: 36 MG/DL (ref 8–23)
CALCIUM SERPL-MCNC: 8.4 MG/DL (ref 8.7–10.5)
CHLORIDE SERPL-SCNC: 95 MMOL/L (ref 95–110)
CO2 SERPL-SCNC: 26 MMOL/L (ref 23–29)
CREAT SERPL-MCNC: 1.5 MG/DL (ref 0.5–1.4)
DIFFERENTIAL METHOD: ABNORMAL
EOSINOPHIL # BLD AUTO: 0 K/UL (ref 0–0.5)
EOSINOPHIL NFR BLD: 0.3 % (ref 0–8)
ERYTHROCYTE [DISTWIDTH] IN BLOOD BY AUTOMATED COUNT: 15.4 % (ref 11.5–14.5)
EST. GFR  (AFRICAN AMERICAN): 36 ML/MIN/1.73 M^2
EST. GFR  (NON AFRICAN AMERICAN): 32 ML/MIN/1.73 M^2
GLUCOSE SERPL-MCNC: 119 MG/DL (ref 70–110)
HCT VFR BLD AUTO: 26.3 % (ref 37–48.5)
HGB BLD-MCNC: 8.6 G/DL (ref 12–16)
IMM GRANULOCYTES # BLD AUTO: 0.05 K/UL (ref 0–0.04)
IMM GRANULOCYTES NFR BLD AUTO: 0.5 % (ref 0–0.5)
LYMPHOCYTES # BLD AUTO: 1.2 K/UL (ref 1–4.8)
LYMPHOCYTES NFR BLD: 12.7 % (ref 18–48)
MAGNESIUM SERPL-MCNC: 1.9 MG/DL (ref 1.6–2.6)
MCH RBC QN AUTO: 31.5 PG (ref 27–31)
MCHC RBC AUTO-ENTMCNC: 32.7 G/DL (ref 32–36)
MCV RBC AUTO: 96 FL (ref 82–98)
MONOCYTES # BLD AUTO: 0.7 K/UL (ref 0.3–1)
MONOCYTES NFR BLD: 7 % (ref 4–15)
NEUTROPHILS # BLD AUTO: 7.5 K/UL (ref 1.8–7.7)
NEUTROPHILS NFR BLD: 79.2 % (ref 38–73)
NRBC BLD-RTO: 0 /100 WBC
PLATELET # BLD AUTO: 228 K/UL (ref 150–450)
PMV BLD AUTO: 9.2 FL (ref 9.2–12.9)
POCT GLUCOSE: 134 MG/DL (ref 70–110)
POCT GLUCOSE: 139 MG/DL (ref 70–110)
POCT GLUCOSE: 139 MG/DL (ref 70–110)
POTASSIUM SERPL-SCNC: 4 MMOL/L (ref 3.5–5.1)
RBC # BLD AUTO: 2.73 M/UL (ref 4–5.4)
SODIUM SERPL-SCNC: 131 MMOL/L (ref 136–145)
WBC # BLD AUTO: 9.45 K/UL (ref 3.9–12.7)

## 2022-01-01 PROCEDURE — 63600175 PHARM REV CODE 636 W HCPCS: Performed by: FAMILY MEDICINE

## 2022-01-01 PROCEDURE — 27000190 HC CPAP FULL FACE MASK W/VALVE

## 2022-01-01 PROCEDURE — 94799 UNLISTED PULMONARY SVC/PX: CPT

## 2022-01-01 PROCEDURE — 85025 COMPLETE CBC W/AUTO DIFF WBC: CPT | Performed by: FAMILY MEDICINE

## 2022-01-01 PROCEDURE — 63600175 PHARM REV CODE 636 W HCPCS: Performed by: HOSPITALIST

## 2022-01-01 PROCEDURE — 83735 ASSAY OF MAGNESIUM: CPT | Performed by: FAMILY MEDICINE

## 2022-01-01 PROCEDURE — 99291 PR CRITICAL CARE, E/M 30-74 MINUTES: ICD-10-PCS | Mod: 95,GC,, | Performed by: INTERNAL MEDICINE

## 2022-01-01 PROCEDURE — 25000003 PHARM REV CODE 250: Performed by: FAMILY MEDICINE

## 2022-01-01 PROCEDURE — 94660 CPAP INITIATION&MGMT: CPT

## 2022-01-01 PROCEDURE — 94761 N-INVAS EAR/PLS OXIMETRY MLT: CPT

## 2022-01-01 PROCEDURE — 99900035 HC TECH TIME PER 15 MIN (STAT)

## 2022-01-01 PROCEDURE — 25000003 PHARM REV CODE 250: Performed by: INTERNAL MEDICINE

## 2022-01-01 PROCEDURE — 99291 CRITICAL CARE FIRST HOUR: CPT | Mod: 95,GC,, | Performed by: INTERNAL MEDICINE

## 2022-01-01 PROCEDURE — 25000003 PHARM REV CODE 250: Performed by: HOSPITALIST

## 2022-01-01 PROCEDURE — 27100171 HC OXYGEN HIGH FLOW UP TO 24 HOURS

## 2022-01-01 PROCEDURE — 80048 BASIC METABOLIC PNL TOTAL CA: CPT | Performed by: HOSPITALIST

## 2022-01-01 PROCEDURE — 20000000 HC ICU ROOM

## 2022-01-01 RX ORDER — AMLODIPINE BESYLATE 5 MG/1
5 TABLET ORAL DAILY
Status: DISCONTINUED | OUTPATIENT
Start: 2022-01-01 | End: 2022-01-04 | Stop reason: HOSPADM

## 2022-01-01 RX ORDER — METOPROLOL SUCCINATE 25 MG/1
25 TABLET, EXTENDED RELEASE ORAL DAILY
Status: DISCONTINUED | OUTPATIENT
Start: 2022-01-01 | End: 2022-01-04 | Stop reason: HOSPADM

## 2022-01-01 RX ORDER — AMOXICILLIN 250 MG
2 CAPSULE ORAL 2 TIMES DAILY
Status: DISCONTINUED | OUTPATIENT
Start: 2022-01-01 | End: 2022-01-04 | Stop reason: HOSPADM

## 2022-01-01 RX ORDER — MUPIROCIN 20 MG/G
OINTMENT TOPICAL 2 TIMES DAILY
Status: DISCONTINUED | OUTPATIENT
Start: 2022-01-01 | End: 2022-01-04 | Stop reason: HOSPADM

## 2022-01-01 RX ORDER — FUROSEMIDE 40 MG/1
40 TABLET ORAL DAILY
Status: DISCONTINUED | OUTPATIENT
Start: 2022-01-01 | End: 2022-01-04 | Stop reason: HOSPADM

## 2022-01-01 RX ADMIN — FUROSEMIDE 40 MG: 10 INJECTION, SOLUTION INTRAMUSCULAR; INTRAVENOUS at 01:01

## 2022-01-01 RX ADMIN — MUPIROCIN: 20 OINTMENT TOPICAL at 09:01

## 2022-01-01 RX ADMIN — CLOPIDOGREL 75 MG: 75 TABLET, FILM COATED ORAL at 08:01

## 2022-01-01 RX ADMIN — ASPIRIN 81 MG: 81 TABLET, COATED ORAL at 08:01

## 2022-01-01 RX ADMIN — FUROSEMIDE 40 MG: 40 TABLET ORAL at 08:01

## 2022-01-01 RX ADMIN — ATORVASTATIN CALCIUM 20 MG: 10 TABLET, FILM COATED ORAL at 08:01

## 2022-01-01 RX ADMIN — LORAZEPAM 0.5 MG: 0.5 TABLET ORAL at 09:01

## 2022-01-01 RX ADMIN — CEFTRIAXONE 2 G: 2 INJECTION, SOLUTION INTRAVENOUS at 09:01

## 2022-01-01 RX ADMIN — MUPIROCIN: 20 OINTMENT TOPICAL at 08:01

## 2022-01-01 RX ADMIN — SENNOSIDES AND DOCUSATE SODIUM 2 TABLET: 50; 8.6 TABLET ORAL at 01:01

## 2022-01-01 RX ADMIN — METOPROLOL SUCCINATE 25 MG: 25 TABLET, EXTENDED RELEASE ORAL at 05:01

## 2022-01-01 RX ADMIN — LORAZEPAM 0.5 MG: 0.5 TABLET ORAL at 08:01

## 2022-01-01 RX ADMIN — AMLODIPINE BESYLATE 5 MG: 5 TABLET ORAL at 05:01

## 2022-01-01 RX ADMIN — FAMOTIDINE 20 MG: 10 INJECTION INTRAVENOUS at 08:01

## 2022-01-01 RX ADMIN — ENOXAPARIN SODIUM 30 MG: 30 INJECTION, SOLUTION INTRAVENOUS; SUBCUTANEOUS at 05:01

## 2022-01-01 RX ADMIN — AZITHROMYCIN MONOHYDRATE 500 MG: 500 INJECTION, POWDER, LYOPHILIZED, FOR SOLUTION INTRAVENOUS at 10:01

## 2022-01-01 RX ADMIN — SENNOSIDES AND DOCUSATE SODIUM 2 TABLET: 50; 8.6 TABLET ORAL at 09:01

## 2022-01-01 NOTE — PLAN OF CARE
Pt resting in bed. Remained free of skin breakdown and injury during shift. Vital signs remained stable. Tolerated bipap well with no signs of distress. Urine output of 1600mL via martino.

## 2022-01-01 NOTE — ASSESSMENT & PLAN NOTE
TTE with severe AS  Patient has declined surgery and TAVR  Suspect she will continue to have issues with CHF exacerbations due to severe AS  Palliative care consulted - DNR, wants hospice

## 2022-01-01 NOTE — ASSESSMENT & PLAN NOTE
A1c:   Lab Results   Component Value Date    LABA1C 5.6 05/31/2018    HGBA1C 5.9 (H) 12/31/2021   Hold home glipizide    Meds: SSI PRN to maintain goal 140-180  ADA diet if can tolerated, accuchecks, hypoglycemic protocol

## 2022-01-01 NOTE — ASSESSMENT & PLAN NOTE
12/31/21: Hypertensive emergency on presentation. Transient hypotension. Currently stable.    01/01/2022:  Monitor.

## 2022-01-01 NOTE — ASSESSMENT & PLAN NOTE
Patient admitted with Hypoxic which is Acute.  she is not on home oxygen. Signs/symptoms of respiratory failure include- tachypnea and increased work of breathing present on admission.   - Labs and images were reviewed. Patient Has recent ABG, which has been reviewed..   - Supplemental oxygen was provided and noted- vapotherm   - Respiratory failure is due to- CHF and Pneumonia   - lasix diuresis for CHF  - continue CTX/azithro for PNA. Sputum culture normal resp megan. Will complete 5 days

## 2022-01-01 NOTE — PLAN OF CARE
Problem: Infection  Goal: Absence of Infection Signs and Symptoms  Outcome: Ongoing, Progressing     Problem: Adult Inpatient Plan of Care  Goal: Plan of Care Review  Outcome: Ongoing, Progressing  Goal: Patient-Specific Goal (Individualized)  Outcome: Ongoing, Progressing  Goal: Absence of Hospital-Acquired Illness or Injury  Outcome: Ongoing, Progressing  Goal: Optimal Comfort and Wellbeing  Outcome: Ongoing, Progressing  Goal: Readiness for Transition of Care  Outcome: Ongoing, Progressing     Problem: Diabetes Comorbidity  Goal: Blood Glucose Level Within Targeted Range  Outcome: Ongoing, Progressing     Problem: Communication Impairment (Mechanical Ventilation, Invasive)  Goal: Effective Communication  Outcome: Ongoing, Progressing     Problem: Device-Related Complication Risk (Mechanical Ventilation, Invasive)  Goal: Optimal Device Function  Outcome: Ongoing, Progressing     Problem: Inability to Wean (Mechanical Ventilation, Invasive)  Goal: Mechanical Ventilation Liberation  Outcome: Ongoing, Progressing     Problem: Nutrition Impairment (Mechanical Ventilation, Invasive)  Goal: Optimal Nutrition Delivery  Outcome: Ongoing, Progressing     Problem: Skin and Tissue Injury (Mechanical Ventilation, Invasive)  Goal: Absence of Device-Related Skin and Tissue Injury  Outcome: Ongoing, Progressing     Problem: Ventilator-Induced Lung Injury (Mechanical Ventilation, Invasive)  Goal: Absence of Ventilator-Induced Lung Injury  Outcome: Ongoing, Progressing     Problem: Communication Impairment (Artificial Airway)  Goal: Effective Communication  Outcome: Ongoing, Progressing     Problem: Device-Related Complication Risk (Artificial Airway)  Goal: Optimal Device Function  Outcome: Ongoing, Progressing     Problem: Skin and Tissue Injury (Artificial Airway)  Goal: Absence of Device-Related Skin or Tissue Injury  Outcome: Ongoing, Progressing     Problem: Noninvasive Ventilation Acute  Goal: Effective Unassisted  Ventilation and Oxygenation  Outcome: Ongoing, Progressing     Problem: Fall Injury Risk  Goal: Absence of Fall and Fall-Related Injury  Outcome: Ongoing, Progressing     Problem: Restraint, Nonbehavioral (Nonviolent)  Goal: Absence of Harm or Injury  Outcome: Ongoing, Progressing     Problem: Skin Injury Risk Increased  Goal: Skin Health and Integrity  Outcome: Ongoing, Progressing     Problem: Coping Ineffective  Goal: Effective Coping  Outcome: Ongoing, Progressing     Problem: Fluid and Electrolyte Imbalance (Acute Kidney Injury/Impairment)  Goal: Fluid and Electrolyte Balance  Outcome: Ongoing, Progressing     Problem: Oral Intake Inadequate (Acute Kidney Injury/Impairment)  Goal: Optimal Nutrition Intake  Outcome: Ongoing, Progressing     Problem: Renal Function Impairment (Acute Kidney Injury/Impairment)  Goal: Effective Renal Function  Outcome: Ongoing, Progressing     Problem: Fluid Imbalance (Pneumonia)  Goal: Fluid Balance  Outcome: Ongoing, Progressing     Problem: Infection (Pneumonia)  Goal: Resolution of Infection Signs and Symptoms  Outcome: Ongoing, Progressing     Problem: Respiratory Compromise (Pneumonia)  Goal: Effective Oxygenation and Ventilation  Outcome: Ongoing, Progressing

## 2022-01-01 NOTE — ASSESSMENT & PLAN NOTE
Cr 1.8 on admit, worsening from baseline 0.9-1.2. Suspect due to HTN emergency + CHF exacerbation  - continue lasix diuresis  - TANIA improving

## 2022-01-01 NOTE — PLAN OF CARE
Patient interested in Hospice; MD requested informational session while in hospital; plan to discharge to NH with Hospice once weaned to O2NC;  referral sent to [facility]       01/01/22 8542   Post-Acute Status   Post-Acute Authorization Hospice   Hospice Status Referrals Sent   Discharge Delays None known at this time   Discharge Plan   Discharge Plan A Hospice/home       Received response from [facility]; requested face sheet, H&P and hospice order be faxed to [phone];

## 2022-01-01 NOTE — ASSESSMENT & PLAN NOTE
BP to max 225/89 on admit with pulmonary edema and respiratory failure. BP then became low and required vasopressors. Now off vasopressors and BP is elevated  - monitor BP  - resume amlodipine and metoprolol. Hold ARB for TANIA

## 2022-01-01 NOTE — ASSESSMENT & PLAN NOTE
With HTN emergency on admit, then became hypotensive  BP elevated  Resume home amlodipine and metoprolol. Continue to hold ARB until TANAI resolved.

## 2022-01-01 NOTE — SUBJECTIVE & OBJECTIVE
Interval History: Extubated    Review of Systems   Constitutional: Negative for diaphoresis and malaise/fatigue.   HENT: Positive for congestion.    Cardiovascular: Negative for chest pain, dyspnea on exertion, irregular heartbeat, leg swelling, near-syncope, orthopnea, palpitations, paroxysmal nocturnal dyspnea and syncope.   Respiratory: Negative for shortness of breath, sputum production and wheezing.    Gastrointestinal: Negative for abdominal pain, heartburn, hematemesis and melena.   Neurological: Negative for dizziness, focal weakness, light-headedness, numbness, paresthesias, seizures and weakness.   Psychiatric/Behavioral: The patient is nervous/anxious.      Objective:     Vital Signs (Most Recent):  Temp: 99.14 °F (37.3 °C) (01/01/22 0645)  Pulse: 85 (01/01/22 0645)  Resp: (!) 21 (01/01/22 0645)  BP: 129/65 (01/01/22 0600)  SpO2: 98 % (01/01/22 0645) Vital Signs (24h Range):  Temp:  [98.96 °F (37.2 °C)-100.8 °F (38.2 °C)] 99.14 °F (37.3 °C)  Pulse:  [73-98] 85  Resp:  [20-79] 21  SpO2:  [88 %-100 %] 98 %  BP: ()/(44-69) 129/65     Weight: 74.2 kg (163 lb 9.3 oz)  Body mass index is 29.92 kg/m².     SpO2: 98 %  O2 Device (Oxygen Therapy): BiPAP      Intake/Output Summary (Last 24 hours) at 1/1/2022 0830  Last data filed at 1/1/2022 0644  Gross per 24 hour   Intake 685.83 ml   Output 2755 ml   Net -2069.17 ml       Lines/Drains/Airways     Central Venous Catheter Line            Percutaneous Central Line Insertion/Assessment - Triple Lumen  12/30/21 2115 right internal jugular 1 day          Drain                 Urethral Catheter 12/30/21 2200 16 Fr. 1 day          Peripheral Intravenous Line                 Peripheral IV - Single Lumen 12/30/21 2100 22 G Right Hand 1 day                Physical Exam  Vitals reviewed.   Constitutional:       General: She is not in acute distress.     Appearance: She is not diaphoretic.   Neck:      Vascular: No carotid bruit or JVD.   Cardiovascular:      Rate and  Rhythm: Normal rate and regular rhythm.      Pulses: Normal pulses.      Heart sounds: Murmur heard.    Harsh crescendo midsystolic murmur is present with a grade of 3/6 at the upper right sternal border radiating to the neck.      Pulmonary:      Breath sounds: Normal breath sounds. No decreased air movement.   Abdominal:      General: Bowel sounds are normal.      Palpations: Abdomen is soft.      Tenderness: There is no abdominal tenderness.   Musculoskeletal:      Right lower leg: No edema.      Left lower leg: No edema.   Neurological:      Mental Status: She is alert.   Psychiatric:         Speech: Speech normal.         Behavior: Behavior normal.         Thought Content: Thought content normal.         Significant Labs:   CMP   Recent Labs   Lab 12/30/21 1925 12/30/21 1925 12/31/21 0234 12/31/21 0241 01/01/22  0352   *   < > 129* 129* 131*   K 5.6*   < > 4.8 4.8 4.0   CL 98   < > 94* 94* 95   CO2 19*   < > 22* 23 26   *   < > 229* 230* 119*   BUN 35*   < > 37* 37* 36*   CREATININE 1.9*   < > 1.9* 1.8* 1.5*   CALCIUM 9.7   < > 9.3 9.3 8.4*   PROT 8.1  --  7.6  --   --    ALBUMIN 3.8  --  3.6  --   --    BILITOT 0.4  --  0.4  --   --    ALKPHOS 62  --  55  --   --    AST 24  --  24  --   --    ALT 23  --  21  --   --    ANIONGAP 12   < > 13 12 10   ESTGFRAFRICA 27*   < > 27* 29* 36*   EGFRNONAA 24*   < > 24* 25* 32*    < > = values in this interval not displayed.   , CBC   Recent Labs   Lab 12/30/21 1925 12/30/21 1925 12/31/21 0234 12/31/21  0234 01/01/22  0352   WBC 16.68*  --  17.43*  --  9.45   HGB 11.3*  --  10.7*  --  8.6*   HCT 34.9*   < > 32.5*   < > 26.3*     --  302  --  228    < > = values in this interval not displayed.    and Troponin   Recent Labs   Lab 12/30/21  1925 12/31/21  0234 12/31/21  0842   TROPONINI 0.038* 0.077* 0.095*       Significant Imaging: Echocardiogram:   Transthoracic echo (TTE) complete (Cupid Only):   Results for orders placed or performed during the  hospital encounter of 07/09/21   Echo   Result Value Ref Range    BSA 1.86 m2    TDI SEPTAL 0.03 m/s    LV LATERAL E/E' RATIO 33.50 m/s    LV SEPTAL E/E' RATIO 44.67 m/s    LA WIDTH 5.17 cm    AORTIC VALVE CUSP SEPERATION 1.18 cm    TDI LATERAL 0.04 m/s    PV PEAK VELOCITY 1.11 cm/s    LVIDd 3.18 (A) 3.5 - 6.0 cm    IVS 2.01 (A) 0.6 - 1.1 cm    Posterior Wall 1.96 (A) 0.6 - 1.1 cm    Ao root annulus 4.41 cm    LVIDs 2.17 2.1 - 4.0 cm    FS 32 28 - 44 %    LA volume 81.33 cm3    Sinus 4.04 cm    STJ 3.17 cm    Ascending aorta 3.28 cm    LV mass 277.96 g    LA size 2.75 cm    RVDD 3.72 cm    TAPSE 1.29 cm    RV S' 7.78 cm/s    Left Ventricle Relative Wall Thickness 1.23 cm    AV mean gradient 51 mmHg    AV valve area 0.77 cm2    AV Velocity Ratio 0.29     AV index (prosthetic) 0.26     MV mean gradient 1 mmHg    MV valve area p 1/2 method 2.00 cm2    E/A ratio 0.75     Mean e' 0.04 m/s    E wave deceleration time 380.12 msec    IVRT 113.80 msec    Pulm vein S/D ratio 0.88     LVOT diameter 1.96 cm    LVOT area 3.0 cm2    LVOT peak kenney 1.31 m/s    LVOT peak VTI 27.75 cm    Ao peak kenney 4.58 m/s    Ao .99 cm    LVOT stroke volume 83.68 cm3    AV peak gradient 84 mmHg    MV peak gradient 15 mmHg    E/E' ratio 38.29 m/s    MV Peak E Kenney 1.34 m/s    TR Max Kenney 2.56 m/s    MV stenosis pressure 1/2 time 110.23 ms    MV Peak A Kenney 1.78 m/s    PV Peak S Kenney 0.29 m/s    PV Peak D Kenney 0.33 m/s    LV Systolic Volume 15.74 mL    LV Systolic Volume Index 8.7 mL/m2    LV Diastolic Volume 40.39 mL    LV Diastolic Volume Index 22.31 mL/m2    LA Volume Index 44.9 mL/m2    LV Mass Index 154 g/m2    RA Major Axis 5.90 cm    Left Atrium Minor Axis 6.73 cm    Left Atrium Major Axis 6.73 cm    Triscuspid Valve Regurgitation Peak Gradient 26 mmHg    RA Width 2.75 cm    Right Atrial Pressure (from IVC) 3 mmHg    EF 60 %    TV rest pulmonary artery pressure 29 mmHg    Narrative    · The left ventricle is normal in size with concentric  hypertrophy and   normal systolic function.  · The estimated ejection fraction is 60%.  · Grade I left ventricular diastolic dysfunction.  · Normal right ventricular size with normal right ventricular systolic   function.  · Moderate left atrial enlargement.  · There is severe aortic valve stenosis.  · Aortic valve area is 0.77 cm2; peak velocity is 4.58 m/s; mean gradient   is 51 mmHg.  · Mild aortic regurgitation.  · Normal central venous pressure (3 mmHg).  · The estimated PA systolic pressure is 29 mmHg.  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 4.0 cm

## 2022-01-01 NOTE — ACP (ADVANCE CARE PLANNING)
"Advance Care Planning     Date: 01/01/2022    Code Status  In light of the patients advanced and life limiting illness,I engaged the the patient in a conversation about the patient's preferences for care  at the very end of life. Discussed her stage 4 breast cancer and severe aortic stenosis. Specifically discussed that the severe AS cannot be fixed except with surgery vs TAVR. She very clearly states "don't cut on me, no surgery." She states that she has told her children this too. Discussed that she will continue to have hospitalizations for this problem. She said she is "tired of calling the ambulance." The patient wishes to have a natural, peaceful death. She said "I'm ready." Along those lines, the patient does not wish to have CPR or other invasive treatments performed when her heart and/or breathing stops. I communicated to the patient that a DNR order was placed in chart. I asked if she had ever considered hospice again. She asked about how hospice would work in the nursing home. I discussed this and told her we can discuss this further when family has arrived.     Discussed this further with patient's son when he arrived. He agreed with DNR status and he is also interested in hospice at nursing home.    Social work aware and will work to set up meeting.     I spent a total of 25 minutes engaging the patient and her son in this advance care planning discussion.     Chloe Parks MD  01/01/2022  9:53 AM         "

## 2022-01-01 NOTE — PLAN OF CARE
[facility] - Intensive Care  Initial Discharge Assessment       Primary Care Provider: No primary care provider on file.    Admission Diagnosis: Acute pulmonary edema [J81.0]  SOB (shortness of breath) [R06.02]  Hypoxia [R09.02]  Encounter for central line placement [Z45.2]  Encounter for intubation [Z01.818]  Hypotension [I95.9]    Admission Date: 12/30/2021  Expected Discharge Date:     Discharge Barriers Identified: None    Payor: [employer]ARE / Plan: [employer] / Product Type: Medicare Advantage /     Extended Emergency Contact Information  Primary Emergency Contact: Glo Morales   [facility]  Home Phone: [phone]  Relation: Sister    Discharge Plan A: Return to nursing home  Discharge Plan B: Hospice/home      [id number] - [place], [place] - [address]  [address]  [facility] [address]  Phone: [phone] Fax: [phone]      Initial Assessment (most recent)     Adult Discharge Assessment - 01/01/22 1532        Discharge Assessment    Assessment Type Discharge Planning Assessment     Confirmed/corrected address, phone number and insurance No     Reason Other (see comment)   lives in NH - [facility]    Source of Information patient     Communicated JUAN with patient/caregiver Date not available/Unable to determine     Reason For Admission Acute hypoxemic respiratory failure     Lives With facility resident     Facility Arrived From: [facility]     Do you expect to return to your current living situation? Yes     Do you have help at home or someone to help you manage your care at home? Yes     Who are your caregiver(s) and their phone number(s)? Facility staff     Prior to hospitilization cognitive status: Alert/Oriented     Current cognitive status: Alert/Oriented     Walking or Climbing Stairs Difficulty ambulation difficulty, dependent;stair climbing difficulty,  dependent;transferring difficulty, assistance 1 person     Mobility Management wheelchair bound     Dressing/Bathing Difficulty bathing difficulty, assistance 1 person     Equipment Currently Used at Home none     Readmission within 30 days? No     Patient currently being followed by outpatient case management? No     Do you take prescription medications? Yes     Do you have prescription coverage? Yes     Coverage PHN     Do you have any problems affording any of your prescribed medications? No     Is the patient taking medications as prescribed? yes     Who is going to help you get home at discharge? Facility staff     How do you get to doctors appointments? agency     Are you on dialysis? No     Do you take coumadin? No     Discharge Plan A Return to nursing home     Discharge Plan B Hospice/home     DME Needed Upon Discharge  --   tbd    Discharge Plan discussed with: Patient     Discharge Barriers Identified None

## 2022-01-01 NOTE — PROGRESS NOTES
[facility] Intensive Care  Cardiology  Progress Note    Patient Name: Vinita Andrews  MRN: [id number]  Admission Date: 12/30/2021  Hospital Length of Stay: 2 days  Code Status: Full Code   Attending Physician: Chloe Parks MD   Primary Care Physician: Louis Burt MD  Expected Discharge Date:   Principal Problem:Acute hypoxemic respiratory failure    Subjective:     Hospital Course:   No notes on file    Interval History: Extubated    Review of Systems   Constitutional: Negative for diaphoresis and malaise/fatigue.   HENT: Positive for congestion.    Cardiovascular: Negative for chest pain, dyspnea on exertion, irregular heartbeat, leg swelling, near-syncope, orthopnea, palpitations, paroxysmal nocturnal dyspnea and syncope.   Respiratory: Negative for shortness of breath, sputum production and wheezing.    Gastrointestinal: Negative for abdominal pain, heartburn, hematemesis and melena.   Neurological: Negative for dizziness, focal weakness, light-headedness, numbness, paresthesias, seizures and weakness.   Psychiatric/Behavioral: The patient is nervous/anxious.      Objective:     Vital Signs (Most Recent):  Temp: 99.14 °F (37.3 °C) (01/01/22 0645)  Pulse: 85 (01/01/22 0645)  Resp: (!) 21 (01/01/22 0645)  BP: 129/65 (01/01/22 0600)  SpO2: 98 % (01/01/22 0645) Vital Signs (24h Range):  Temp:  [98.96 °F (37.2 °C)-100.8 °F (38.2 °C)] 99.14 °F (37.3 °C)  Pulse:  [73-98] 85  Resp:  [20-79] 21  SpO2:  [88 %-100 %] 98 %  BP: ()/(44-69) 129/65     Weight: 74.2 kg (163 lb 9.3 oz)  Body mass index is 29.92 kg/m².     SpO2: 98 %  O2 Device (Oxygen Therapy): BiPAP      Intake/Output Summary (Last 24 hours) at 1/1/2022 0830  Last data filed at 1/1/2022 0644  Gross per 24 hour   Intake 685.83 ml   Output 2755 ml   Net -2069.17 ml       Lines/Drains/Airways     Central Venous Catheter Line            Percutaneous Central Line Insertion/Assessment - Triple Lumen  12/30/21 9711 right internal jugular 1 day           Drain                 Urethral Catheter 12/30/21 2200 16 Fr. 1 day          Peripheral Intravenous Line                 Peripheral IV - Single Lumen 12/30/21 2100 22 G Right Hand 1 day                Physical Exam  Vitals reviewed.   Constitutional:       General: She is not in acute distress.     Appearance: She is not diaphoretic.   Neck:      Vascular: No carotid bruit or JVD.   Cardiovascular:      Rate and Rhythm: Normal rate and regular rhythm.      Pulses: Normal pulses.      Heart sounds: Murmur heard.    Harsh crescendo midsystolic murmur is present with a grade of 3/6 at the upper right sternal border radiating to the neck.      Pulmonary:      Breath sounds: Normal breath sounds. No decreased air movement.   Abdominal:      General: Bowel sounds are normal.      Palpations: Abdomen is soft.      Tenderness: There is no abdominal tenderness.   Musculoskeletal:      Right lower leg: No edema.      Left lower leg: No edema.   Neurological:      Mental Status: She is alert.   Psychiatric:         Speech: Speech normal.         Behavior: Behavior normal.         Thought Content: Thought content normal.         Significant Labs:   CMP   Recent Labs   Lab 12/30/21 1925 12/30/21 1925 12/31/21 0234 12/31/21 0241 01/01/22  0352   *   < > 129* 129* 131*   K 5.6*   < > 4.8 4.8 4.0   CL 98   < > 94* 94* 95   CO2 19*   < > 22* 23 26   *   < > 229* 230* 119*   BUN 35*   < > 37* 37* 36*   CREATININE 1.9*   < > 1.9* 1.8* 1.5*   CALCIUM 9.7   < > 9.3 9.3 8.4*   PROT 8.1  --  7.6  --   --    ALBUMIN 3.8  --  3.6  --   --    BILITOT 0.4  --  0.4  --   --    ALKPHOS 62  --  55  --   --    AST 24  --  24  --   --    ALT 23  --  21  --   --    ANIONGAP 12   < > 13 12 10   ESTGFRAFRICA 27*   < > 27* 29* 36*   EGFRNONAA 24*   < > 24* 25* 32*    < > = values in this interval not displayed.   , CBC   Recent Labs   Lab 12/30/21 1925 12/30/21 1925 12/31/21 0234 12/31/21  0234 01/01/22  0352   WBC 16.68*  --   17.43*  --  9.45   HGB 11.3*  --  10.7*  --  8.6*   HCT 34.9*   < > 32.5*   < > 26.3*     --  302  --  228    < > = values in this interval not displayed.    and Troponin   Recent Labs   Lab 12/30/21  1925 12/31/21  0234 12/31/21  0842   TROPONINI 0.038* 0.077* 0.095*       Significant Imaging: Echocardiogram:   Transthoracic echo (TTE) complete (Cupid Only):   Results for orders placed or performed during the hospital encounter of 07/09/21   Echo   Result Value Ref Range    BSA 1.86 m2    TDI SEPTAL 0.03 m/s    LV LATERAL E/E' RATIO 33.50 m/s    LV SEPTAL E/E' RATIO 44.67 m/s    LA WIDTH 5.17 cm    AORTIC VALVE CUSP SEPERATION 1.18 cm    TDI LATERAL 0.04 m/s    PV PEAK VELOCITY 1.11 cm/s    LVIDd 3.18 (A) 3.5 - 6.0 cm    IVS 2.01 (A) 0.6 - 1.1 cm    Posterior Wall 1.96 (A) 0.6 - 1.1 cm    Ao root annulus 4.41 cm    LVIDs 2.17 2.1 - 4.0 cm    FS 32 28 - 44 %    LA volume 81.33 cm3    Sinus 4.04 cm    STJ 3.17 cm    Ascending aorta 3.28 cm    LV mass 277.96 g    LA size 2.75 cm    RVDD 3.72 cm    TAPSE 1.29 cm    RV S' 7.78 cm/s    Left Ventricle Relative Wall Thickness 1.23 cm    AV mean gradient 51 mmHg    AV valve area 0.77 cm2    AV Velocity Ratio 0.29     AV index (prosthetic) 0.26     MV mean gradient 1 mmHg    MV valve area p 1/2 method 2.00 cm2    E/A ratio 0.75     Mean e' 0.04 m/s    E wave deceleration time 380.12 msec    IVRT 113.80 msec    Pulm vein S/D ratio 0.88     LVOT diameter 1.96 cm    LVOT area 3.0 cm2    LVOT peak kenney 1.31 m/s    LVOT peak VTI 27.75 cm    Ao peak kenney 4.58 m/s    Ao .99 cm    LVOT stroke volume 83.68 cm3    AV peak gradient 84 mmHg    MV peak gradient 15 mmHg    E/E' ratio 38.29 m/s    MV Peak E Kenney 1.34 m/s    TR Max Kenney 2.56 m/s    MV stenosis pressure 1/2 time 110.23 ms    MV Peak A Kenney 1.78 m/s    PV Peak S Kenney 0.29 m/s    PV Peak D Kenney 0.33 m/s    LV Systolic Volume 15.74 mL    LV Systolic Volume Index 8.7 mL/m2    LV Diastolic Volume 40.39 mL    LV Diastolic  Volume Index 22.31 mL/m2    LA Volume Index 44.9 mL/m2    LV Mass Index 154 g/m2    RA Major Axis 5.90 cm    Left Atrium Minor Axis 6.73 cm    Left Atrium Major Axis 6.73 cm    Triscuspid Valve Regurgitation Peak Gradient 26 mmHg    RA Width 2.75 cm    Right Atrial Pressure (from IVC) 3 mmHg    EF 60 %    TV rest pulmonary artery pressure 29 mmHg    Narrative    · The left ventricle is normal in size with concentric hypertrophy and   normal systolic function.  · The estimated ejection fraction is 60%.  · Grade I left ventricular diastolic dysfunction.  · Normal right ventricular size with normal right ventricular systolic   function.  · Moderate left atrial enlargement.  · There is severe aortic valve stenosis.  · Aortic valve area is 0.77 cm2; peak velocity is 4.58 m/s; mean gradient   is 51 mmHg.  · Mild aortic regurgitation.  · Normal central venous pressure (3 mmHg).  · The estimated PA systolic pressure is 29 mmHg.  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 4.0 cm        Assessment and Plan:     Brief HPI:     Severe aortic stenosis  12/31/21: Last DAV 0.7.    Elevated troponin  12/31/21: secondary to hypertension, aortic stenosis.    Essential hypertension  12/31/21: Hypertensive emergency on presentation. Transient hypotension. Currently stable.    01/01/2022:  Monitor.    Acute on chronic diastolic congestive heart failure  Patient is identified as having Diastolic (HFpEF) heart failure that is Acute on chronic. CHF is currently uncontrolled.  Hypertensive emergency on presentation.  Known severe aortic stenosis.  Latest ECHO performed and demonstrates- Results for orders placed during the hospital encounter of 07/09/21    Echo    Interpretation Summary  · The left ventricle is normal in size with concentric hypertrophy and normal systolic function.  · The estimated ejection fraction is 60%.  · Grade I left ventricular diastolic dysfunction.  · Normal right ventricular size with normal right ventricular  systolic function.  · Moderate left atrial enlargement.  · There is severe aortic valve stenosis.  · Aortic valve area is 0.77 cm2; peak velocity is 4.58 m/s; mean gradient is 51 mmHg.  · Mild aortic regurgitation.  · Normal central venous pressure (3 mmHg).  · The estimated PA systolic pressure is 29 mmHg.  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 4.0 cm  . Continue Beta Blocker and ACE/ARB and monitor clinical status closely. Monitor on telemetry. Patient is on CHF pathway.  Monitor strict Is&Os and daily weights.  Place on fluid restriction of 1.5 L. Continue to stress to patient importance of self efficacy and  on diet for CHF. Last BNP reviewed- and noted below   Recent Labs   Lab 12/30/21 1925   *   .  12/31/2021:  Continue diuresis.  Blood pressure control.  Avoid excessive afterload reduction.    1/1/2022:  Change Lasix to p.o..  Was on 20 mg as an outpatient.  Will start with 40 mg. Creatinine 1.5.        VTE Risk Mitigation (From admission, onward)         Ordered     enoxaparin injection 30 mg  Daily         12/30/21 2314     IP VTE HIGH RISK PATIENT  Once         12/30/21 2314     Place sequential compression device  Until discontinued         12/30/21 2314              Critical Care Time: 30 minutes     Critical care was time spent personally by me on the following activities: development of treatment plan with patient or surrogate and bedside caregivers, discussions with consultants, evaluation of patient's response to treatment, examination of patient, ordering and performing treatments and interventions, ordering and review of laboratory studies, ordering and review of radiographic studies, pulse oximetry, re-evaluation of patient's condition. This critical care time did not overlap with that of any other provider or involve time for any procedures.        Jimmy Robertson MD  Cardiology  [facility] - Intensive Care

## 2022-01-01 NOTE — PROGRESS NOTES
[facility] Medicine  Progress Note    Patient Name: Vinita Andrews  MRN: [id number]  Patient Class: IP- Inpatient   Admission Date: 12/30/2021  Length of Stay: 2 days  Attending Physician: Chloe Parks MD  Primary Care Provider: No primary care provider on file.        Subjective:     Principal Problem:Acute hypoxemic respiratory failure        HPI:  History from ED physician and nurse at bedside.  At time of my evaluation, patient had been intubated.    86 y.o. female with a past medical history of diabetes mellitus, breast cancer, postmastectomy lymphadema syndrome, and diastolic heart failure who presents to the Emergency Department via EMS from [facility] for evaluation of respiratory distress. Per EMS, patient's oxygen saturation was in the 65-70% range at the time of their intervention. Nursing staff reports that the patient may have aspirated while eating dinner. Patient denies any aspiration, choking, or vomiting. However, she states that she has had abdominal pain and nausea since 1300 today. She does not note any other associated symptoms.  There is concern for potential aspiration, although per ED doc patient denied any difficulty with eating today.  Patient experienced chest tightness.  Due to hypoxia and respiratory distress, patient was intubated in the emergency room and central line placed, placement was confirmed via x-ray. At time of intubation, frothy sputum was noted.  Initially patient was hypertensive at 230/100.  After hydralazine, systolic blood pressure was down to around 110. After intubation with sedation, systolic blood pressure transiently went down to 52. Dopamine drip was started and mm AP came up to around 65.  Levophed was ordered as next pressor to be added should blood pressures dropped again.    CT of the abdomen and pelvis was ordered given complaint of abdominal pain.  Patient received dose of ceftriaxone and azithromycin in the  emergency room.    Lab work significant for leukocytosis at 16 point 2, potassium 5.9, creatinine 1.9.  BNP at 748, baseline was 612 five months ago.  Troponin minimally elevated at 0.038.    At time of my evaluation pulse 85, respirations 20, /55 sign 95% on ventilator.    Patient admitted to ICU.  Cardiology consult for suspected flash pulmonary edema/CHF, pulmonology consulted for vent management.    He repeat BMP is pending to re-evaluate potassium as hemolysis is suspected.                 Overview/Hospital Course:  Ms Vinita Andrews is a 86 y.o. woman with HFpEF, severe AS, HTN who presented with hypertensive emergency causing pulmonary edema causing acute hypoxic respiratory failure. She was intubated. Also noted to have leukocytosis with LLL infiltrate; started CTX and azithromycin for potential pneumonia. Also with TANIA and Ecoli UTI. Also known to have metastatic breast cancer. Extubated 12/31 after starting diuresis. Oxygen requirements improving.       Interval History: Sitting up in bed. Says that she feels hot, anxious, and short of breath. Having fever, chills. No cough.     Review of Systems   Constitutional: Positive for chills and fever.   Respiratory: Positive for shortness of breath. Negative for cough.    Cardiovascular: Negative for chest pain and leg swelling.   Gastrointestinal: Negative for abdominal pain, diarrhea, nausea and vomiting.   Genitourinary: Negative for difficulty urinating.   Musculoskeletal: Negative for arthralgias and myalgias.   Neurological: Negative for weakness and numbness.   Psychiatric/Behavioral: Negative for confusion. The patient is nervous/anxious.      Objective:     Vital Signs (Most Recent):  Temp: 99.32 °F (37.4 °C) (01/01/22 1200)  Pulse: 87 (01/01/22 1200)  Resp: (!) 55 (01/01/22 1200)  BP: (!) 159/75 (01/01/22 1200)  SpO2: 96 % (01/01/22 1319) Vital Signs (24h Range):  Temp:  [98.96 °F (37.2 °C)-100.8 °F (38.2 °C)] 99.32 °F (37.4 °C)  Pulse:   [76-98] 87  Resp:  [20-79] 55  SpO2:  [88 %-100 %] 96 %  BP: ()/(52-75) 159/75     Weight: 74.2 kg (163 lb 9.3 oz)  Body mass index is 29.92 kg/m².    Intake/Output Summary (Last 24 hours) at 1/1/2022 1615  Last data filed at 1/1/2022 1300  Gross per 24 hour   Intake 1699.74 ml   Output 3060 ml   Net -1360.26 ml      Physical Exam  Vitals and nursing note reviewed.   Constitutional:       General: She is not in acute distress.     Appearance: She is not toxic-appearing.   HENT:      Head: Normocephalic and atraumatic.      Mouth/Throat:      Mouth: Mucous membranes are moist.   Neck:      Comments: [facility]  Cardiovascular:      Rate and Rhythm: Normal rate and regular rhythm.      Pulses: Normal pulses.      Heart sounds: Normal heart sounds. No murmur heard.  No gallop.    Pulmonary:      Effort: Pulmonary effort is normal. No respiratory distress.      Breath sounds: No wheezing, rhonchi or rales.      Comments: On vapotherm  Abdominal:      General: Bowel sounds are normal. There is no distension.      Palpations: Abdomen is soft.      Tenderness: There is no abdominal tenderness. There is no guarding.   Genitourinary:     Comments: [facility]ner  Musculoskeletal:         General: Deformity (to feet bilaterally) present.      Right lower leg: No edema.      Left lower leg: No edema.      Comments: L arm lymphedema   Skin:     General: Skin is warm and dry.      Comments: [facility]   Neurological:      Mental Status: She is alert and oriented to person, place, and time.         Significant Labs: All pertinent labs within the past 24 hours have been reviewed.    Significant Imaging: I have reviewed all pertinent imaging results/findings within the past 24 hours.      Assessment/Plan:      * Acute hypoxemic respiratory failure  Patient admitted with Hypoxic which is Acute.  she is not on home oxygen. Signs/symptoms of respiratory failure include- tachypnea and increased work of breathing present on admission.   - Labs  and images were reviewed. Patient Has recent ABG, which has been reviewed..   - Supplemental oxygen was provided and noted- vapotherm   - Respiratory failure is due to- CHF and Pneumonia   - lasix diuresis for CHF  - continue CTX/azithro for PNA. Sputum culture normal resp megan. Will complete 5 days       Chronic prescription benzodiazepine use   queried by Dr Parks on 12/31.   - continue home ativan       UTI (urinary tract infection)  EColi UTI  - continue CTX while awaiting sensitivity       Hyponatremia  Related to volume overload and TANIA  - diuresis  - improving       Normocytic anemia  At baseline. No bleeding       Left lower lobe pneumonia  CT with LLL infiltrate. Borderline fever, WBC 17  - ET aspirate normal respiratory megan  - continue CTX, azithro x5 days       Hypertensive emergency  BP to max 225/89 on admit with pulmonary edema and respiratory failure. BP then became low and required vasopressors. Now off vasopressors and BP is elevated  - monitor BP  - resume amlodipine and metoprolol. Hold ARB for TANIA       TANIA (acute kidney injury)  Cr 1.8 on admit, worsening from baseline 0.9-1.2. Suspect due to HTN emergency + CHF exacerbation  - continue lasix diuresis  - TANIA improving       Severe aortic stenosis  TTE with severe AS  Patient has declined surgery and TAVR  Suspect she will continue to have issues with CHF exacerbations due to severe AS  Palliative care consulted - DNR, wants hospice       Secondary malignant neoplasm of bone  From breast cancer      Postmastectomy lymphedema syndrome  Affecting L arm      Hyperkalemia  Associated with TANIA. Improved from on admit after being given lasix       Elevated troponin  Suspect due to hypertensive emergency   Troponins are flat.   - continue asa, plavix, statin      Atrial fibrillation with rapid ventricular response  History of AFib  Not in RVR at this time        Hyperlipidemia  Lipids controlled  Continue statin      Metastatic breast  "cancer  Per Dr Duckworth note 11/2021: "L breast cancer with metastatic disease  - Promarily Skeletal Metastatic Disease associated with elevated Ca 27.29 initially  - [place] with hormonal therapy but residual flako disease in 14 of 16 nodes  - Progression after exemestane Oct 2016. Started Faslodex and Ibrance added.   - Intolerant to Ibrance (GI toxicity)  - Progression of disease while off treatment (while on hospice)Sept 2021  - Revoked Hospice Sept 2021  - Resumed Faslodex Sept 28, 2021  ? Pathologic Fracture T1  - s/p XRT"    Palliative care consulted   Discussed ACP- DNR. Interested in hospice       Type 2 diabetes mellitus with hyperglycemia  A1c:   Lab Results   Component Value Date    LABA1C 5.6 05/31/2018    HGBA1C 5.9 (H) 12/31/2021   Hold home glipizide    Meds: SSI PRN to maintain goal 140-180  ADA diet if can tolerated, accuchecks, hypoglycemic protocol        Essential hypertension  With HTN emergency on admit, then became hypotensive  BP elevated  Resume home amlodipine and metoprolol. Continue to hold ARB until TANIA resolved.         Acute on chronic diastolic congestive heart failure  Patient admitted with shortness of breath consistent with acute on chronic diastolic CHF. Cause of exacerbation is hypertensive emergency + severe AS    Last TTE 7/2021 showed EF 60%, G1DD, severe AS   BNP  Recent Labs   Lab 12/30/21  1925   *     CXR: pulmonary edema  Recent Labs   Lab 12/31/21  0842   TROPONINI 0.095*     Start on IV lasix diuresis. Monitor ins and outs  TTE similar to prior   Cardiology follow up on discharge          VTE Risk Mitigation (From admission, onward)         Ordered     enoxaparin injection 30 mg  Daily         12/30/21 2314     IP VTE HIGH RISK PATIENT  Once         12/30/21 2314     Place sequential compression device  Until discontinued         12/30/21 2314                Discharge Planning   JUAN:      Code Status: DNR   Is the patient medically ready for discharge?:     Reason for " patient still in hospital (select all that apply): Patient trending condition  Discharge Plan A: Hospice/home   Discharge Delays: None known at this time    Plan of care discussed with patient and son.    Critical care time spent on the evaluation and treatment of severe organ dysfunction, review of pertinent labs and imaging studies, discussions with consulting providers and discussions with patient/family: 30 minutes.      Chloe Parks MD  Department of Hospital Medicine   [facility] - Intensive Care

## 2022-01-01 NOTE — ASSESSMENT & PLAN NOTE
"Per Dr Duckworth note 11/2021: "L breast cancer with metastatic disease  - Promarily Skeletal Metastatic Disease associated with elevated Ca 27.29 initially  - [place] with hormonal therapy but residual flako disease in 14 of 16 nodes  - Progression after exemestane Oct 2016. Started Faslodex and Ibrance added.   - Intolerant to Ibrance (GI toxicity)  - Progression of disease while off treatment (while on hospice)Sept 2021  - Revoked Hospice Sept 2021  - Resumed Faslodex Sept 28, 2021  ? Pathologic Fracture T1  - s/p XRT"    Palliative care consulted   Discussed ACP- DNR. Interested in hospice     "

## 2022-01-01 NOTE — SUBJECTIVE & OBJECTIVE
Interval History: Sitting up in bed. Says that she feels hot, anxious, and short of breath. Having fever, chills. No cough.     Review of Systems   Constitutional: Positive for chills and fever.   Respiratory: Positive for shortness of breath. Negative for cough.    Cardiovascular: Negative for chest pain and leg swelling.   Gastrointestinal: Negative for abdominal pain, diarrhea, nausea and vomiting.   Genitourinary: Negative for difficulty urinating.   Musculoskeletal: Negative for arthralgias and myalgias.   Neurological: Negative for weakness and numbness.   Psychiatric/Behavioral: Negative for confusion. The patient is nervous/anxious.      Objective:     Vital Signs (Most Recent):  Temp: 99.32 °F (37.4 °C) (01/01/22 1200)  Pulse: 87 (01/01/22 1200)  Resp: (!) 55 (01/01/22 1200)  BP: (!) 159/75 (01/01/22 1200)  SpO2: 96 % (01/01/22 1319) Vital Signs (24h Range):  Temp:  [98.96 °F (37.2 °C)-100.8 °F (38.2 °C)] 99.32 °F (37.4 °C)  Pulse:  [76-98] 87  Resp:  [20-79] 55  SpO2:  [88 %-100 %] 96 %  BP: ()/(52-75) 159/75     Weight: 74.2 kg (163 lb 9.3 oz)  Body mass index is 29.92 kg/m².    Intake/Output Summary (Last 24 hours) at 1/1/2022 1615  Last data filed at 1/1/2022 1300  Gross per 24 hour   Intake 1699.74 ml   Output 3060 ml   Net -1360.26 ml      Physical Exam  Vitals and nursing note reviewed.   Constitutional:       General: She is not in acute distress.     Appearance: She is not toxic-appearing.   HENT:      Head: Normocephalic and atraumatic.      Mouth/Throat:      Mouth: Mucous membranes are moist.   Neck:      Comments: RIJ TLC  Cardiovascular:      Rate and Rhythm: Normal rate and regular rhythm.      Pulses: Normal pulses.      Heart sounds: Normal heart sounds. No murmur heard.  No gallop.    Pulmonary:      Effort: Pulmonary effort is normal. No respiratory distress.      Breath sounds: No wheezing, rhonchi or rales.      Comments: On vapotherm  Abdominal:      General: Bowel sounds are  normal. There is no distension.      Palpations: Abdomen is soft.      Tenderness: There is no abdominal tenderness. There is no guarding.   Genitourinary:     Comments: gunner  Musculoskeletal:         General: Deformity (to feet bilaterally) present.      Right lower leg: No edema.      Left lower leg: No edema.      Comments: L arm lymphedema   Skin:     General: Skin is warm and dry.      Comments: PHILIP SHEA   Neurological:      Mental Status: She is alert and oriented to person, place, and time.         Significant Labs: All pertinent labs within the past 24 hours have been reviewed.    Significant Imaging: I have reviewed all pertinent imaging results/findings within the past 24 hours.

## 2022-01-01 NOTE — ASSESSMENT & PLAN NOTE
Patient is identified as having Diastolic (HFpEF) heart failure that is Acute on chronic. CHF is currently uncontrolled.  Hypertensive emergency on presentation.  Known severe aortic stenosis.  Latest ECHO performed and demonstrates- Results for orders placed during the hospital encounter of 07/09/21    Echo    Interpretation Summary  · The left ventricle is normal in size with concentric hypertrophy and normal systolic function.  · The estimated ejection fraction is 60%.  · Grade I left ventricular diastolic dysfunction.  · Normal right ventricular size with normal right ventricular systolic function.  · Moderate left atrial enlargement.  · There is severe aortic valve stenosis.  · Aortic valve area is 0.77 cm2; peak velocity is 4.58 m/s; mean gradient is 51 mmHg.  · Mild aortic regurgitation.  · Normal central venous pressure (3 mmHg).  · The estimated PA systolic pressure is 29 mmHg.  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 4.0 cm  . Continue Beta Blocker and ACE/ARB and monitor clinical status closely. Monitor on telemetry. Patient is on CHF pathway.  Monitor strict Is&Os and daily weights.  Place on fluid restriction of 1.5 L. Continue to stress to patient importance of self efficacy and  on diet for CHF. Last BNP reviewed- and noted below   Recent Labs   Lab 12/30/21 1925   *   .  12/31/2021:  Continue diuresis.  Blood pressure control.  Avoid excessive afterload reduction.    1/1/2022:  Change Lasix to p.o..  Was on 20 mg as an outpatient.  Will start with 40 mg. Creatinine 1.5.

## 2022-01-01 NOTE — ASSESSMENT & PLAN NOTE
CT with LLL infiltrate. Borderline fever, WBC 17  - ET aspirate normal respiratory megan  - continue CTX, azithro x5 days

## 2022-01-01 NOTE — ASSESSMENT & PLAN NOTE
Patient admitted with shortness of breath consistent with acute on chronic diastolic CHF. Cause of exacerbation is hypertensive emergency + severe AS    Last TTE 7/2021 showed EF 60%, G1DD, severe AS   BNP  Recent Labs   Lab 12/30/21  1925   *     CXR: pulmonary edema  Recent Labs   Lab 12/31/21  0842   TROPONINI 0.095*     Start on IV lasix diuresis. Monitor ins and outs  TTE similar to prior   Cardiology follow up on discharge

## 2022-01-02 PROBLEM — K59.00 CONSTIPATION: Status: ACTIVE | Noted: 2022-01-02

## 2022-01-02 PROBLEM — Z71.89 GOALS OF CARE, COUNSELING/DISCUSSION: Status: ACTIVE | Noted: 2022-01-02

## 2022-01-02 PROBLEM — Z16.12 UTI DUE TO EXTENDED-SPECTRUM BETA LACTAMASE (ESBL) PRODUCING ESCHERICHIA COLI: Status: ACTIVE | Noted: 2021-12-31

## 2022-01-02 PROBLEM — B96.29 UTI DUE TO EXTENDED-SPECTRUM BETA LACTAMASE (ESBL) PRODUCING ESCHERICHIA COLI: Status: ACTIVE | Noted: 2021-12-31

## 2022-01-02 LAB
ANION GAP SERPL CALC-SCNC: 9 MMOL/L (ref 8–16)
BACTERIA SPEC AEROBE CULT: NORMAL
BACTERIA UR CULT: ABNORMAL
BACTERIA UR CULT: ABNORMAL
BUN SERPL-MCNC: 28 MG/DL (ref 8–23)
CALCIUM SERPL-MCNC: 8.6 MG/DL (ref 8.7–10.5)
CHLORIDE SERPL-SCNC: 96 MMOL/L (ref 95–110)
CO2 SERPL-SCNC: 27 MMOL/L (ref 23–29)
CREAT SERPL-MCNC: 1.2 MG/DL (ref 0.5–1.4)
EST. GFR  (AFRICAN AMERICAN): 48 ML/MIN/1.73 M^2
EST. GFR  (NON AFRICAN AMERICAN): 41 ML/MIN/1.73 M^2
GLUCOSE SERPL-MCNC: 123 MG/DL (ref 70–110)
GRAM STN SPEC: NORMAL
POCT GLUCOSE: 103 MG/DL (ref 70–110)
POCT GLUCOSE: 159 MG/DL (ref 70–110)
POCT GLUCOSE: 208 MG/DL (ref 70–110)
POTASSIUM SERPL-SCNC: 4 MMOL/L (ref 3.5–5.1)
SODIUM SERPL-SCNC: 132 MMOL/L (ref 136–145)

## 2022-01-02 PROCEDURE — 25000003 PHARM REV CODE 250: Performed by: INTERNAL MEDICINE

## 2022-01-02 PROCEDURE — 20000000 HC ICU ROOM

## 2022-01-02 PROCEDURE — 63700000 PHARM REV CODE 250 ALT 637 W/O HCPCS: Performed by: HOSPITALIST

## 2022-01-02 PROCEDURE — 27000221 HC OXYGEN, UP TO 24 HOURS

## 2022-01-02 PROCEDURE — 25000003 PHARM REV CODE 250: Performed by: FAMILY MEDICINE

## 2022-01-02 PROCEDURE — 99900035 HC TECH TIME PER 15 MIN (STAT)

## 2022-01-02 PROCEDURE — 94660 CPAP INITIATION&MGMT: CPT

## 2022-01-02 PROCEDURE — 25000242 PHARM REV CODE 250 ALT 637 W/ HCPCS: Performed by: HOSPITALIST

## 2022-01-02 PROCEDURE — 25000242 PHARM REV CODE 250 ALT 637 W/ HCPCS

## 2022-01-02 PROCEDURE — 63600175 PHARM REV CODE 636 W HCPCS: Performed by: FAMILY MEDICINE

## 2022-01-02 PROCEDURE — 25000003 PHARM REV CODE 250: Performed by: HOSPITALIST

## 2022-01-02 PROCEDURE — 94761 N-INVAS EAR/PLS OXIMETRY MLT: CPT

## 2022-01-02 PROCEDURE — 63600175 PHARM REV CODE 636 W HCPCS: Performed by: HOSPITALIST

## 2022-01-02 PROCEDURE — 80048 BASIC METABOLIC PNL TOTAL CA: CPT | Performed by: HOSPITALIST

## 2022-01-02 PROCEDURE — 36415 COLL VENOUS BLD VENIPUNCTURE: CPT | Performed by: HOSPITALIST

## 2022-01-02 RX ORDER — POLYETHYLENE GLYCOL 3350 17 G/17G
17 POWDER, FOR SOLUTION ORAL 2 TIMES DAILY
Status: DISCONTINUED | OUTPATIENT
Start: 2022-01-02 | End: 2022-01-04 | Stop reason: HOSPADM

## 2022-01-02 RX ORDER — NITROGLYCERIN 0.4 MG/1
0.4 TABLET SUBLINGUAL ONCE
Status: COMPLETED | OUTPATIENT
Start: 2022-01-02 | End: 2022-01-02

## 2022-01-02 RX ORDER — PSEUDOEPHEDRINE/ACETAMINOPHEN 30MG-500MG
100 TABLET ORAL
Status: COMPLETED | OUTPATIENT
Start: 2022-01-02 | End: 2022-01-02

## 2022-01-02 RX ORDER — LORAZEPAM 0.5 MG/1
0.5 TABLET ORAL EVERY 6 HOURS PRN
Status: DISCONTINUED | OUTPATIENT
Start: 2022-01-02 | End: 2022-01-04 | Stop reason: HOSPADM

## 2022-01-02 RX ORDER — SYRING-NEEDL,DISP,INSUL,0.3 ML 29 G X1/2"
296 SYRINGE, EMPTY DISPOSABLE MISCELLANEOUS
Status: COMPLETED | OUTPATIENT
Start: 2022-01-02 | End: 2022-01-02

## 2022-01-02 RX ORDER — AZITHROMYCIN 250 MG/1
250 TABLET, FILM COATED ORAL DAILY
Status: COMPLETED | OUTPATIENT
Start: 2022-01-02 | End: 2022-01-03

## 2022-01-02 RX ORDER — METOPROLOL SUCCINATE 25 MG/1
25 TABLET, EXTENDED RELEASE ORAL DAILY
Start: 2022-01-02

## 2022-01-02 RX ORDER — HYDROMORPHONE HYDROCHLORIDE 2 MG/ML
0.5 INJECTION, SOLUTION INTRAMUSCULAR; INTRAVENOUS; SUBCUTANEOUS ONCE
Status: DISCONTINUED | OUTPATIENT
Start: 2022-01-02 | End: 2022-01-04 | Stop reason: HOSPADM

## 2022-01-02 RX ORDER — FUROSEMIDE 40 MG/1
40 TABLET ORAL DAILY
Qty: 30 TABLET | Refills: 11
Start: 2022-01-02 | End: 2023-01-02

## 2022-01-02 RX ORDER — NITROGLYCERIN 0.4 MG/1
TABLET SUBLINGUAL
Status: COMPLETED
Start: 2022-01-02 | End: 2022-01-02

## 2022-01-02 RX ORDER — BISACODYL 10 MG
10 SUPPOSITORY, RECTAL RECTAL DAILY PRN
Status: DISCONTINUED | OUTPATIENT
Start: 2022-01-02 | End: 2022-01-04 | Stop reason: HOSPADM

## 2022-01-02 RX ADMIN — METOPROLOL SUCCINATE 25 MG: 25 TABLET, EXTENDED RELEASE ORAL at 09:01

## 2022-01-02 RX ADMIN — ATORVASTATIN CALCIUM 20 MG: 10 TABLET, FILM COATED ORAL at 09:01

## 2022-01-02 RX ADMIN — LORAZEPAM 0.5 MG: 0.5 TABLET ORAL at 09:01

## 2022-01-02 RX ADMIN — INSULIN ASPART 2 UNITS: 100 INJECTION, SOLUTION INTRAVENOUS; SUBCUTANEOUS at 11:01

## 2022-01-02 RX ADMIN — ASPIRIN 81 MG: 81 TABLET, COATED ORAL at 09:01

## 2022-01-02 RX ADMIN — ERTAPENEM 1 G: 1 INJECTION, POWDER, LYOPHILIZED, FOR SOLUTION INTRAMUSCULAR; INTRAVENOUS at 09:01

## 2022-01-02 RX ADMIN — FUROSEMIDE 40 MG: 40 TABLET ORAL at 09:01

## 2022-01-02 RX ADMIN — Medication 296 ML: at 10:01

## 2022-01-02 RX ADMIN — SENNOSIDES AND DOCUSATE SODIUM 2 TABLET: 50; 8.6 TABLET ORAL at 09:01

## 2022-01-02 RX ADMIN — ENOXAPARIN SODIUM 30 MG: 30 INJECTION, SOLUTION INTRAVENOUS; SUBCUTANEOUS at 04:01

## 2022-01-02 RX ADMIN — LORAZEPAM 0.5 MG: 0.5 TABLET ORAL at 10:01

## 2022-01-02 RX ADMIN — NITROGLYCERIN 0.4 MG: 0.4 TABLET, ORALLY DISINTEGRATING SUBLINGUAL at 08:01

## 2022-01-02 RX ADMIN — MUPIROCIN: 20 OINTMENT TOPICAL at 09:01

## 2022-01-02 RX ADMIN — POLYETHYLENE GLYCOL 3350 17 G: 17 POWDER, FOR SOLUTION ORAL at 09:01

## 2022-01-02 RX ADMIN — AMLODIPINE BESYLATE 5 MG: 5 TABLET ORAL at 09:01

## 2022-01-02 RX ADMIN — AZITHROMYCIN MONOHYDRATE 250 MG: 250 TABLET ORAL at 09:01

## 2022-01-02 RX ADMIN — NITROGLYCERIN 0.4 MG: 0.4 TABLET SUBLINGUAL at 08:01

## 2022-01-02 RX ADMIN — CLOPIDOGREL 75 MG: 75 TABLET, FILM COATED ORAL at 09:01

## 2022-01-02 RX ADMIN — SODIUM CHLORIDE 500 ML: 9 INJECTION, SOLUTION INTRAVENOUS at 09:01

## 2022-01-02 RX ADMIN — Medication 100 ML: at 10:01

## 2022-01-02 RX ADMIN — MUPIROCIN: 20 OINTMENT TOPICAL at 08:01

## 2022-01-02 NOTE — PLAN OF CARE
Problem: Infection  Goal: Absence of Infection Signs and Symptoms  1/2/2022 0700 by Adriana Talley RN  Outcome: Ongoing, Progressing  1/2/2022 0639 by Adriana Talley RN  Outcome: Ongoing, Progressing     Problem: Adult Inpatient Plan of Care  Goal: Plan of Care Review  1/2/2022 0700 by Adriana Talley RN  Outcome: Ongoing, Progressing  1/2/2022 0639 by Adriana Talley RN  Outcome: Ongoing, Progressing  Goal: Patient-Specific Goal (Individualized)  1/2/2022 0700 by Adriana Talley RN  Outcome: Ongoing, Progressing  1/2/2022 0639 by Adriana Talley RN  Outcome: Ongoing, Progressing  Goal: Absence of Hospital-Acquired Illness or Injury  1/2/2022 0700 by Adriana Talley RN  Outcome: Ongoing, Progressing  1/2/2022 0639 by Adriana Talley RN  Outcome: Ongoing, Progressing  Goal: Optimal Comfort and Wellbeing  1/2/2022 0700 by Adriana Talley RN  Outcome: Ongoing, Progressing  1/2/2022 0639 by Adriana Talley RN  Outcome: Ongoing, Progressing  Goal: Readiness for Transition of Care  1/2/2022 0700 by Adriana Talley RN  Outcome: Ongoing, Progressing  1/2/2022 0639 by Adriana Talley RN  Outcome: Ongoing, Progressing     Problem: Diabetes Comorbidity  Goal: Blood Glucose Level Within Targeted Range  1/2/2022 0700 by Adriana Talley RN  Outcome: Ongoing, Progressing  1/2/2022 0639 by Adriana Talley RN  Outcome: Ongoing, Progressing     Problem: Communication Impairment (Mechanical Ventilation, Invasive)  Goal: Effective Communication  1/2/2022 0700 by Adriana Talley RN  Outcome: Met  1/2/2022 0639 by Adriana Talley RN  Outcome: Ongoing, Progressing     Problem: Device-Related Complication Risk (Mechanical Ventilation, Invasive)  Goal: Optimal Device Function  1/2/2022 0700 by Adriana Talley RN  Outcome: Met  1/2/2022 0639 by Adriana Talley RN  Outcome: Ongoing, Progressing     Problem: Inability to Wean (Mechanical Ventilation, Invasive)  Goal: Mechanical  Ventilation Liberation  1/2/2022 0700 by Adriana Talley RN  Outcome: Met  1/2/2022 0639 by Adriana Talley RN  Outcome: Ongoing, Progressing     Problem: Skin and Tissue Injury (Mechanical Ventilation, Invasive)  Goal: Absence of Device-Related Skin and Tissue Injury  1/2/2022 0700 by Adriana Talley RN  Outcome: Met  1/2/2022 0639 by Adriana Talley RN  Outcome: Ongoing, Progressing     Problem: Ventilator-Induced Lung Injury (Mechanical Ventilation, Invasive)  Goal: Absence of Ventilator-Induced Lung Injury  1/2/2022 0700 by Adriana Talley RN  Outcome: Met  1/2/2022 0639 by Adriana Talley RN  Outcome: Ongoing, Progressing     Problem: Communication Impairment (Artificial Airway)  Goal: Effective Communication  1/2/2022 0700 by Adriana Talley RN  Outcome: Met  1/2/2022 0639 by Adriana Talley RN  Outcome: Ongoing, Progressing     Problem: Device-Related Complication Risk (Artificial Airway)  Goal: Optimal Device Function  1/2/2022 0700 by Adriana Talley RN  Outcome: Met  1/2/2022 0639 by Adriana Talley RN  Outcome: Ongoing, Progressing

## 2022-01-02 NOTE — ASSESSMENT & PLAN NOTE
"Per Dr Duckworth note 11/2021: "L breast cancer with metastatic disease  - Promarily Skeletal Metastatic Disease associated with elevated Ca 27.29 initially  - [place] with hormonal therapy but residual flako disease in 14 of 16 nodes  - Progression after exemestane Oct 2016. Started Faslodex and Ibrance added.   - Intolerant to Ibrance (GI toxicity)  - Progression of disease while off treatment (while on hospice)Sept 2021  - Revoked Hospice Sept 2021  - Resumed Faslodex Sept 28, 2021  ? Pathologic Fracture T1  - s/p XRT"    Palliative care consulted   Discussed ACP- DNR. Interested in hospice. Social work consulted     "

## 2022-01-02 NOTE — SUBJECTIVE & OBJECTIVE
Interval History: Sitting up in bed. Says she hasn't had a BM in 3 days and feels very constipated. She has abdominal pain associated with this.     Review of Systems   Constitutional: Negative for chills and fever.   Respiratory: Negative for cough and shortness of breath.    Cardiovascular: Negative for chest pain and leg swelling.   Gastrointestinal: Positive for abdominal pain and constipation. Negative for diarrhea, nausea and vomiting.   Genitourinary: Negative for difficulty urinating.   Musculoskeletal: Negative for arthralgias and myalgias.   Neurological: Negative for weakness and numbness.   Psychiatric/Behavioral: Negative for confusion. The patient is nervous/anxious.      Objective:     Vital Signs (Most Recent):  Temp: 98.78 °F (37.1 °C) (01/02/22 0900)  Pulse: 81 (01/02/22 0900)  Resp: (!) 32 (01/02/22 0900)  BP: (!) 159/71 (01/02/22 0900)  SpO2: 97 % (01/02/22 0900) Vital Signs (24h Range):  Temp:  [98.6 °F (37 °C)-99.5 °F (37.5 °C)] 98.78 °F (37.1 °C)  Pulse:  [62-90] 81  Resp:  [19-56] 32  SpO2:  [94 %-100 %] 97 %  BP: (119-182)/() 159/71     Weight: 71.3 kg (157 lb 3 oz)  Body mass index is 28.75 kg/m².    Intake/Output Summary (Last 24 hours) at 1/2/2022 0956  Last data filed at 1/2/2022 0919  Gross per 24 hour   Intake 2170 ml   Output 2140 ml   Net 30 ml      Physical Exam  Vitals and nursing note reviewed.   Constitutional:       General: She is not in acute distress.     Appearance: She is not toxic-appearing.   HENT:      Head: Normocephalic and atraumatic.      Mouth/Throat:      Mouth: Mucous membranes are moist.   Neck:      Comments: RIJ TLC  Cardiovascular:      Rate and Rhythm: Normal rate and regular rhythm.      Pulses: Normal pulses.      Heart sounds: Normal heart sounds. No murmur heard.  No gallop.    Pulmonary:      Effort: Pulmonary effort is normal. No respiratory distress.      Breath sounds: No wheezing, rhonchi or rales.      Comments: On vapotherm  Abdominal:       General: Bowel sounds are normal. There is no distension.      Palpations: Abdomen is soft.      Tenderness: There is no abdominal tenderness. There is no guarding.   Musculoskeletal:      Right lower leg: No edema.      Left lower leg: No edema.      Comments: L arm lymphedema   Skin:     General: Skin is warm and dry.      Comments: PHILIP TLC   Neurological:      Mental Status: She is alert and oriented to person, place, and time.         Significant Labs: All pertinent labs within the past 24 hours have been reviewed.    Significant Imaging: I have reviewed all pertinent imaging results/findings within the past 24 hours.

## 2022-01-02 NOTE — PROGRESS NOTES
[facility] Medicine  Progress Note    Patient Name: Vinita Andrews  MRN: [id number]  Patient Class: IP- Inpatient   Admission Date: 12/30/2021  Length of Stay: 3 days  Attending Physician: Chloe Parks MD  Primary Care Provider: No primary care provider on file.        Subjective:     Principal Problem:Acute hypoxemic respiratory failure        HPI:  History from ED physician and nurse at bedside.  At time of my evaluation, patient had been intubated.    86 y.o. female with a past medical history of diabetes mellitus, breast cancer, postmastectomy lymphadema syndrome, and diastolic heart failure who presents to the Emergency Department via EMS from [facility] for evaluation of respiratory distress. Per EMS, patient's oxygen saturation was in the 65-70% range at the time of their intervention. Nursing staff reports that the patient may have aspirated while eating dinner. Patient denies any aspiration, choking, or vomiting. However, she states that she has had abdominal pain and nausea since 1300 today. She does not note any other associated symptoms.  There is concern for potential aspiration, although per ED doc patient denied any difficulty with eating today.  Patient experienced chest tightness.  Due to hypoxia and respiratory distress, patient was intubated in the emergency room and central line placed, placement was confirmed via x-ray. At time of intubation, frothy sputum was noted.  Initially patient was hypertensive at 230/100.  After hydralazine, systolic blood pressure was down to around 110. After intubation with sedation, systolic blood pressure transiently went down to 52. Dopamine drip was started and mm AP came up to around 65.  Levophed was ordered as next pressor to be added should blood pressures dropped again.    CT of the abdomen and pelvis was ordered given complaint of abdominal pain.  Patient received dose of ceftriaxone and azithromycin in the  emergency room.    Lab work significant for leukocytosis at 16 point 2, potassium 5.9, creatinine 1.9.  BNP at 748, baseline was 612 five months ago.  Troponin minimally elevated at 0.038.    At time of my evaluation pulse 85, respirations 20, /55 sign 95% on ventilator.    Patient admitted to ICU.  Cardiology consult for suspected flash pulmonary edema/CHF, pulmonology consulted for vent management.    He repeat BMP is pending to re-evaluate potassium as hemolysis is suspected.                 Overview/Hospital Course:  Ms Vinita Andrews is a 86 y.o. woman with HFpEF, severe AS, HTN who presented with hypertensive emergency causing pulmonary edema causing acute hypoxic respiratory failure. She was intubated. Also noted to have leukocytosis with LLL infiltrate; started antibiotics. Also with TANIA and ESBL Ecoli UTI. Also known to have metastatic breast cancer. Extubated 12/31 after starting diuresis. Oxygen requirements improving. Discussed code status and goals of care-- DNR, looking into hospice at nursing home.       Interval History: Sitting up in bed. Says she hasn't had a BM in 3 days and feels very constipated. She has abdominal pain associated with this.     Review of Systems   Constitutional: Negative for chills and fever.   Respiratory: Negative for cough and shortness of breath.    Cardiovascular: Negative for chest pain and leg swelling.   Gastrointestinal: Positive for abdominal pain and constipation. Negative for diarrhea, nausea and vomiting.   Genitourinary: Negative for difficulty urinating.   Musculoskeletal: Negative for arthralgias and myalgias.   Neurological: Negative for weakness and numbness.   Psychiatric/Behavioral: Negative for confusion. The patient is nervous/anxious.      Objective:     Vital Signs (Most Recent):  Temp: 98.78 °F (37.1 °C) (01/02/22 0900)  Pulse: 81 (01/02/22 0900)  Resp: (!) 32 (01/02/22 0900)  BP: (!) 159/71 (01/02/22 0900)  SpO2: 97 % (01/02/22 0900) Vital  Signs (24h Range):  Temp:  [98.6 °F (37 °C)-99.5 °F (37.5 °C)] 98.78 °F (37.1 °C)  Pulse:  [62-90] 81  Resp:  [19-56] 32  SpO2:  [94 %-100 %] 97 %  BP: (119-182)/() 159/71     Weight: 71.3 kg (157 lb 3 oz)  Body mass index is 28.75 kg/m².    Intake/Output Summary (Last 24 hours) at 1/2/2022 0956  Last data filed at 1/2/2022 0919  Gross per 24 hour   Intake 2170 ml   Output 2140 ml   Net 30 ml      Physical Exam  Vitals and nursing note reviewed.   Constitutional:       General: She is not in acute distress.     Appearance: She is not toxic-appearing.   HENT:      Head: Normocephalic and atraumatic.      Mouth/Throat:      Mouth: Mucous membranes are moist.   Neck:      Comments: [facility]  Cardiovascular:      Rate and Rhythm: Normal rate and regular rhythm.      Pulses: Normal pulses.      Heart sounds: Normal heart sounds. No murmur heard.  No gallop.    Pulmonary:      Effort: Pulmonary effort is normal. No respiratory distress.      Breath sounds: No wheezing, rhonchi or rales.      Comments: On vapotherm  Abdominal:      General: Bowel sounds are normal. There is no distension.      Palpations: Abdomen is soft.      Tenderness: There is no abdominal tenderness. There is no guarding.   Musculoskeletal:      Right lower leg: No edema.      Left lower leg: No edema.      Comments: L arm lymphedema   Skin:     General: Skin is warm and dry.      Comments: RIJ TLC   Neurological:      Mental Status: She is alert and oriented to person, place, and time.         Significant Labs: All pertinent labs within the past 24 hours have been reviewed.    Significant Imaging: I have reviewed all pertinent imaging results/findings within the past 24 hours.      Assessment/Plan:      * Acute hypoxemic respiratory failure  Patient admitted with Hypoxic which is Acute.  she is not on home oxygen. Signs/symptoms of respiratory failure include- tachypnea and increased work of breathing present on admission.   - Labs and images  were reviewed. Patient Has recent ABG, which has been reviewed..   - Supplemental oxygen was provided and noted- vapotherm   - Respiratory failure is due to- CHF and Pneumonia   - lasix diuresis for CHF  - continue antibiotics for 5 days PNA coverage      Goals of care, counseling/discussion  Code status = DNR  Interested in hospice  Social work consulted  Palliative consulted    Plan hospice at nursing home on discharge    Constipation  Brown bomb enema today       Chronic prescription benzodiazepine use   queried by Dr Parks on 12/31.   - continue home ativan - increased for anxiety       UTI due to extended-spectrum beta lactamase (ESBL) producing Escherichia coli  ESBL EColi UTI. Changed to ertapenem x7 days        Hyponatremia  Related to volume overload and TANIA  - diuresis  - improving       Normocytic anemia  At baseline. No bleeding       Left lower lobe pneumonia  CT with LLL infiltrate. Borderline fever, WBC 17  - ET aspirate normal respiratory megan  - continue antibiotics x5 days       Hypertensive emergency  BP to max 225/89 on admit with pulmonary edema and respiratory failure. BP then became low and required vasopressors. Now off vasopressors and BP is elevated  - monitor BP  - resume amlodipine and metoprolol. Hold ARB for TANIA       TANIA (acute kidney injury)  Cr 1.8 on admit, worsening from baseline 0.9-1.2. Suspect due to HTN emergency + CHF exacerbation  - continue lasix diuresis  - TANIA improving       Severe aortic stenosis  TTE with severe AS  Patient has declined surgery and TAVR  Suspect she will continue to have issues with CHF exacerbations due to severe AS  Palliative care consulted - DNR, wants hospice. Social work consulted       Secondary malignant neoplasm of bone  From breast cancer      Postmastectomy lymphedema syndrome  Affecting L arm      Hyperkalemia  Associated with TANIA. Improved from on admit after being given lasix       Elevated troponin  Suspect due to hypertensive  "emergency   Troponins are flat.   - continue asa, plavix, statin      Atrial fibrillation with rapid ventricular response  History of AFib  Not in RVR at this time        Hyperlipidemia  Lipids controlled  Continue statin      Metastatic breast cancer  Per Dr Duckworth note 11/2021: "L breast cancer with metastatic disease  - Promarily Skeletal Metastatic Disease associated with elevated Ca 27.29 initially  - [place] with hormonal therapy but residual flako disease in 14 of 16 nodes  - Progression after exemestane Oct 2016. Started Faslodex and Ibrance added.   - Intolerant to Ibrance (GI toxicity)  - Progression of disease while off treatment (while on hospice)Sept 2021  - Revoked Hospice Sept 2021  - Resumed Faslodex Sept 28, 2021  ? Pathologic Fracture T1  - s/p XRT"    Palliative care consulted   Discussed ACP- DNR. Interested in hospice. Social work consulted       Type 2 diabetes mellitus with hyperglycemia  A1c:   Lab Results   Component Value Date    LABA1C 5.6 05/31/2018    HGBA1C 5.9 (H) 12/31/2021   Hold home glipizide    Meds: SSI PRN to maintain goal 140-180  ADA diet if can tolerated, accuchecks, hypoglycemic protocol        Essential hypertension  With HTN emergency on admit, then became hypotensive. Now normal.   Resume home amlodipine and metoprolol.   Continue to hold ARB until TANIA resolved.         Acute on chronic diastolic congestive heart failure  Patient admitted with shortness of breath consistent with acute on chronic diastolic CHF. Cause of exacerbation is hypertensive emergency + severe AS    Last TTE 7/2021 showed EF 60%, G1DD, severe AS   BNP  Recent Labs   Lab 12/30/21  1925   *     CXR: pulmonary edema  Recent Labs   Lab 12/31/21  0842   TROPONINI 0.095*     Now on PO lasix  TTE similar to prior   Cardiology follow up on discharge          VTE Risk Mitigation (From admission, onward)         Ordered     enoxaparin injection 30 mg  Daily         12/30/21 2314     IP VTE HIGH RISK " PATIENT  Once         12/30/21 2314     Place sequential compression device  Until discontinued         12/30/21 2314                Discharge Planning   JUAN:      Code Status: DNR   Is the patient medically ready for discharge?:     Reason for patient still in hospital (select all that apply): Patient trending condition  Discharge Plan A: Hospice/home   Discharge Delays: None known at this time        Chloe Parks MD  Department of Hospital Medicine   [facility] - Intensive Care

## 2022-01-02 NOTE — NURSING
Pt c/o chest tightness this morning; 12 lead EKG done and Dr. Parks notified.  Pt reported no change after 1 sl ntg. She is also c/o constipation.  09:45 Pt denies pain at this time. She wishes to wait to have enema later this morning.

## 2022-01-02 NOTE — ASSESSMENT & PLAN NOTE
With HTN emergency on admit, then became hypotensive. Now normal.   Resume home amlodipine and metoprolol.   Continue to hold ARB until TANIA resolved.

## 2022-01-02 NOTE — PROVIDER TRANSFER
Ms Vinita Andrews is a 86 y.o. woman with metastatic breast cancer, HFpEF, severe AS, HTN who presented with hypertensive emergency causing pulmonary edema causing acute hypoxic respiratory failure. She was intubated. Also noted to have leukocytosis with LLL infiltrate; started antibiotics. Also with TANIA and ESBL Ecoli UTI. Extubated 12/31 after starting diuresis. Oxygen requirements improving. Discussed code status and goals of care-- DNR, looking into hospice at nursing home.     To do  - set up hospice at nursing home - social work already working on it. Plan discharge to hospice tomorrow.   - Med rec is done  - may need O2     Chloe Parks MD  01/02/2022  10:04 AM

## 2022-01-02 NOTE — PROGRESS NOTES
Blood pressure improved. On amlodipine. Preserved LVEF. Severe AS. Pulmonary edema secondary to AS, HTN emergency. On PO lasix. Continue. No further CV recommendations. Will sign off. Please re consult if needed.

## 2022-01-02 NOTE — ASSESSMENT & PLAN NOTE
Patient admitted with shortness of breath consistent with acute on chronic diastolic CHF. Cause of exacerbation is hypertensive emergency + severe AS    Last TTE 7/2021 showed EF 60%, G1DD, severe AS   BNP  Recent Labs   Lab 12/30/21  1925   *     CXR: pulmonary edema  Recent Labs   Lab 12/31/21  0842   TROPONINI 0.095*     Now on PO lasix  TTE similar to prior   Cardiology follow up on discharge

## 2022-01-02 NOTE — PLAN OF CARE
Received call from [facility]; patient is in agreement to hospice and has signed consents; plans to return to [facility] Monday under hospice; [facility] requested late discharge a they need time to investigate prior hospice involvement and financials       01/02/22 9130   Post-Acute Status   Post-Acute Authorization Hospice   Hospice Status Pending post acute provider review/more information requested   Discharge Plan   Discharge Plan A Hospice/home

## 2022-01-02 NOTE — PLAN OF CARE
Problem: Infection  Goal: Absence of Infection Signs and Symptoms  Outcome: Ongoing, Progressing     Problem: Adult Inpatient Plan of Care  Goal: Plan of Care Review  Outcome: Ongoing, Progressing  Goal: Patient-Specific Goal (Individualized)  Outcome: Ongoing, Progressing  Goal: Absence of Hospital-Acquired Illness or Injury  Outcome: Ongoing, Progressing  Goal: Optimal Comfort and Wellbeing  Outcome: Ongoing, Progressing  Goal: Readiness for Transition of Care  Outcome: Ongoing, Progressing     Problem: Diabetes Comorbidity  Goal: Blood Glucose Level Within Targeted Range  Outcome: Ongoing, Progressing     Problem: Skin Injury Risk Increased  Goal: Skin Health and Integrity  Outcome: Ongoing, Progressing     Problem: Coping Ineffective  Goal: Effective Coping  Outcome: Ongoing, Progressing

## 2022-01-02 NOTE — ASSESSMENT & PLAN NOTE
CT with LLL infiltrate. Borderline fever, WBC 17  - ET aspirate normal respiratory megan  - continue antibiotics x5 days

## 2022-01-02 NOTE — PLAN OF CARE
Problem: Infection  Goal: Absence of Infection Signs and Symptoms  Outcome: Ongoing, Progressing     Problem: Adult Inpatient Plan of Care  Goal: Plan of Care Review  Outcome: Ongoing, Progressing  Goal: Patient-Specific Goal (Individualized)  Outcome: Ongoing, Progressing  Goal: Absence of Hospital-Acquired Illness or Injury  Outcome: Ongoing, Progressing  Goal: Optimal Comfort and Wellbeing  Outcome: Ongoing, Progressing  Goal: Readiness for Transition of Care  Outcome: Ongoing, Progressing     Problem: Diabetes Comorbidity  Goal: Blood Glucose Level Within Targeted Range  Outcome: Ongoing, Progressing     Problem: Communication Impairment (Mechanical Ventilation, Invasive)  Goal: Effective Communication  Outcome: Ongoing, Progressing

## 2022-01-02 NOTE — ASSESSMENT & PLAN NOTE
Patient admitted with Hypoxic which is Acute.  she is not on home oxygen. Signs/symptoms of respiratory failure include- tachypnea and increased work of breathing present on admission.   - Labs and images were reviewed. Patient Has recent ABG, which has been reviewed..   - Supplemental oxygen was provided and noted- vapotherm   - Respiratory failure is due to- CHF and Pneumonia   - lasix diuresis for CHF  - continue antibiotics for 5 days PNA coverage

## 2022-01-02 NOTE — ASSESSMENT & PLAN NOTE
TTE with severe AS  Patient has declined surgery and TAVR  Suspect she will continue to have issues with CHF exacerbations due to severe AS  Palliative care consulted - DNR, wants hospice. Social work consulted

## 2022-01-03 PROBLEM — J96.01 ACUTE HYPOXEMIC RESPIRATORY FAILURE: Status: RESOLVED | Noted: 2021-12-30 | Resolved: 2022-01-03

## 2022-01-03 PROBLEM — N17.9 AKI (ACUTE KIDNEY INJURY): Status: RESOLVED | Noted: 2021-12-31 | Resolved: 2022-01-03

## 2022-01-03 PROBLEM — I16.1 HYPERTENSIVE EMERGENCY: Status: RESOLVED | Noted: 2021-12-31 | Resolved: 2022-01-03

## 2022-01-03 PROBLEM — E87.5 HYPERKALEMIA: Status: RESOLVED | Noted: 2021-06-26 | Resolved: 2022-01-03

## 2022-01-03 LAB
BACTERIA BLD CULT: NORMAL
BACTERIA BLD CULT: NORMAL
POCT GLUCOSE: 149 MG/DL (ref 70–110)
POCT GLUCOSE: 163 MG/DL (ref 70–110)
POCT GLUCOSE: 202 MG/DL (ref 70–110)
POCT GLUCOSE: 98 MG/DL (ref 70–110)

## 2022-01-03 PROCEDURE — 20000000 HC ICU ROOM

## 2022-01-03 PROCEDURE — 63600175 PHARM REV CODE 636 W HCPCS: Performed by: HOSPITALIST

## 2022-01-03 PROCEDURE — 25000003 PHARM REV CODE 250: Performed by: FAMILY MEDICINE

## 2022-01-03 PROCEDURE — 25000003 PHARM REV CODE 250: Performed by: HOSPITALIST

## 2022-01-03 PROCEDURE — 25000003 PHARM REV CODE 250: Performed by: INTERNAL MEDICINE

## 2022-01-03 PROCEDURE — 63600175 PHARM REV CODE 636 W HCPCS: Performed by: INTERNAL MEDICINE

## 2022-01-03 PROCEDURE — 63700000 PHARM REV CODE 250 ALT 637 W/O HCPCS: Performed by: HOSPITALIST

## 2022-01-03 PROCEDURE — 94761 N-INVAS EAR/PLS OXIMETRY MLT: CPT

## 2022-01-03 RX ORDER — HYDRALAZINE HYDROCHLORIDE 20 MG/ML
10 INJECTION INTRAMUSCULAR; INTRAVENOUS ONCE
Status: COMPLETED | OUTPATIENT
Start: 2022-01-03 | End: 2022-01-03

## 2022-01-03 RX ORDER — HYDRALAZINE HYDROCHLORIDE 25 MG/1
25 TABLET, FILM COATED ORAL EVERY 8 HOURS PRN
Status: DISCONTINUED | OUTPATIENT
Start: 2022-01-03 | End: 2022-01-04 | Stop reason: HOSPADM

## 2022-01-03 RX ORDER — AMOXICILLIN AND CLAVULANATE POTASSIUM 400; 57 MG/5ML; MG/5ML
400 POWDER, FOR SUSPENSION ORAL DAILY
Qty: 25 ML | Refills: 0 | Status: SHIPPED | OUTPATIENT
Start: 2022-01-04 | End: 2022-01-09

## 2022-01-03 RX ORDER — LOSARTAN POTASSIUM 25 MG/1
100 TABLET ORAL DAILY
Status: COMPLETED | OUTPATIENT
Start: 2022-01-03 | End: 2022-01-03

## 2022-01-03 RX ADMIN — FUROSEMIDE 40 MG: 40 TABLET ORAL at 08:01

## 2022-01-03 RX ADMIN — AZITHROMYCIN MONOHYDRATE 250 MG: 250 TABLET ORAL at 08:01

## 2022-01-03 RX ADMIN — MUPIROCIN: 20 OINTMENT TOPICAL at 08:01

## 2022-01-03 RX ADMIN — LOSARTAN POTASSIUM 100 MG: 25 TABLET, FILM COATED ORAL at 02:01

## 2022-01-03 RX ADMIN — LORAZEPAM 0.5 MG: 0.5 TABLET ORAL at 06:01

## 2022-01-03 RX ADMIN — ASPIRIN 81 MG: 81 TABLET, COATED ORAL at 08:01

## 2022-01-03 RX ADMIN — AMLODIPINE BESYLATE 5 MG: 5 TABLET ORAL at 08:01

## 2022-01-03 RX ADMIN — LORAZEPAM 0.5 MG: 0.5 TABLET ORAL at 03:01

## 2022-01-03 RX ADMIN — ERTAPENEM 1 G: 1 INJECTION, POWDER, LYOPHILIZED, FOR SOLUTION INTRAMUSCULAR; INTRAVENOUS at 09:01

## 2022-01-03 RX ADMIN — METOPROLOL SUCCINATE 25 MG: 25 TABLET, EXTENDED RELEASE ORAL at 08:01

## 2022-01-03 RX ADMIN — HYDRALAZINE HYDROCHLORIDE 10 MG: 20 INJECTION, SOLUTION INTRAMUSCULAR; INTRAVENOUS at 04:01

## 2022-01-03 RX ADMIN — CLOPIDOGREL 75 MG: 75 TABLET, FILM COATED ORAL at 08:01

## 2022-01-03 RX ADMIN — HYDRALAZINE HYDROCHLORIDE 25 MG: 25 TABLET ORAL at 03:01

## 2022-01-03 RX ADMIN — INSULIN ASPART 2 UNITS: 100 INJECTION, SOLUTION INTRAVENOUS; SUBCUTANEOUS at 11:01

## 2022-01-03 RX ADMIN — ATORVASTATIN CALCIUM 20 MG: 10 TABLET, FILM COATED ORAL at 08:01

## 2022-01-03 NOTE — PLAN OF CARE
[facility] - Intensive Care  Discharge Final Note    Primary Care Provider: No primary care provider on file.    Expected Discharge Date: 1/3/2022    CARINE informed nurse Shannan via chat report information that patient is ready for discharge from case management standpoint. CARINE provided Shannan with report information via secure chat. CARINE also explained to patient that she is discharging back to [facility] today.    EDUCATION:  Things You are responsible For To Manage Your Care At Home:  1.    Getting your prescriptions filled   2.    Taking your medications as directed, DO NOT MISS ANY DOSES!  3.    Going to your follow-up doctor appointment. This is important because it  allow the doctor to monitor your progress and determine if  any changes need to made to your treatment plan.  Call [facility] number for new or repeated problems / symptoms   Call 911 for CP and / or SOB        Final Discharge Note (most recent)       Final Note - 01/03/22 1232          Final Note    Assessment Type Final Discharge Note (P)      Anticipated Discharge Disposition Intermediate Care Facility for care home or Supportive Care     What phone number can be called within the next 1-3 days to see how you are doing after discharge? [phone]        Post-Acute Status    Post-Acute Authorization Placement     Post-Acute Placement Status Set-up Complete/Auth obtained     Hospice Status Set-up Complete/Auth obtained     Coverage PHN     Discharge Delays None known at this time                     Important Message from Medicare  Important Message from Medicare regarding Discharge Appeal Rights: Given to patient/caregiver,Explained to patient/caregiver,Signed/date by patient/caregiver     Date IMM was signed: 01/03/22  Time IMM was signed: 1210    Contact Info       [facility]    [address]  [place] [address]       Next Steps: Go on 1/3/2022    Instructions: Nursing Home Placement     [facility]   Specialty: Hospice and Palliative Medicine, Hospice Services, Home Health Services    [address]  [address]  [facility] [address]   Phone: [phone]       Next Steps: Go today    Instructions: Hospice

## 2022-01-03 NOTE — PROGRESS NOTES
Call returned to [place] with [facility].  Per [place], consents have been signed and patient will be returning to nursing home with hospice.  Case management to submit orders via careport.

## 2022-01-03 NOTE — NURSING
Called report to Caroline at [facility]. Phone number [phone]. Pt to transfer back to nursing home today.  14:04 BP elevated today;MD aware. Dose of losartan ordered and given.   15:35 Dr. Desai at bedside.  Pt now c/o feeling anxious; ativan given; to continue to monitor BP .  16:00 SBP in 190's. The ambulance team is here to transport the pt back to nursing home. They spoke with their supervisor re: BP and I was told they could not transport pt at this time and they would send a different team in a couple of hours.  17:35 Spoke with Pranav at [facility]; he stated pt 's estimated  time is now 20:30.  18:55 Pt asleep; v/s within acceptable parameters; awaiting ambulance; report given to oncoming RN.

## 2022-01-03 NOTE — PLAN OF CARE
Pt remains alert and oriented in ICU with transfer orders. HR running NSR on monitor. BP stable. Pt on 4L NC sating above 95%. Blood sugar monitored per order. Insulin given based on sliding scale. Pt had complaints of anxiety, prn ativan givenx2 doses. Pt updated on plan of care. No new falls, injuries, or skin breakdown this shift.

## 2022-01-03 NOTE — DISCHARGE SUMMARY
[facility]  Discharge Summary      Patient Name: Vinita Andrews  MRN: [id number]  Patient Class: IP- Inpatient  Admission Date: 12/30/2021  Hospital Length of Stay: 4 days  Discharge Date and Time:  01/03/2022 4:04 PM  Attending Physician: Sarai De La Paz MD   Discharging Provider: Sarai Desai MD  Primary Care Provider: No primary care provider on file.      HPI:   History from ED physician and nurse at bedside.  At time of my evaluation, patient had been intubated.    86 y.o. female with a past medical history of diabetes mellitus, breast cancer, postmastectomy lymphadema syndrome, and diastolic heart failure who presents to the Emergency Department via EMS from [facility] for evaluation of respiratory distress. Per EMS, patient's oxygen saturation was in the 65-70% range at the time of their intervention. Nursing staff reports that the patient may have aspirated while eating dinner. Patient denies any aspiration, choking, or vomiting. However, she states that she has had abdominal pain and nausea since 1300 today. She does not note any other associated symptoms.  There is concern for potential aspiration, although per ED doc patient denied any difficulty with eating today.  Patient experienced chest tightness.  Due to hypoxia and respiratory distress, patient was intubated in the emergency room and central line placed, placement was confirmed via x-ray. At time of intubation, frothy sputum was noted.  Initially patient was hypertensive at 230/100.  After hydralazine, systolic blood pressure was down to around 110. After intubation with sedation, systolic blood pressure transiently went down to 52. Dopamine drip was started and mm AP came up to around 65.  Levophed was ordered as next pressor to be added should blood pressures dropped again.    CT of the abdomen and pelvis was ordered given complaint of abdominal pain.  Patient received dose of ceftriaxone and  azithromycin in the emergency room.    Lab work significant for leukocytosis at 16 point 2, potassium 5.9, creatinine 1.9.  BNP at 748, baseline was 612 five months ago.  Troponin minimally elevated at 0.038.    At time of my evaluation pulse 85, respirations 20, /55 sign 95% on ventilator.    - Abad Phelps MD    Patient admitted to ICU.  Cardiology consult for suspected flash pulmonary edema/CHF, pulmonology consulted for vent management.    He repeat BMP is pending to re-evaluate potassium as hemolysis is suspected.                 * No surgery found *      Hospital Course:   Ms Vinita Andrews is a 86 y.o. woman with HFpEF, severe AS, HTN who presented with hypertensive emergency causing pulmonary edema causing acute hypoxic respiratory failure. She was intubated. Also noted to have leukocytosis with LLL infiltrate; started antibiotics. Also with TANIA and ESBL Ecoli UTI. Also known to have metastatic breast cancer. Extubated 12/31 after starting diuresis. Oxygen requirements improving. Dr Parks discussed code status and goals of care-- DNR and hospice at nursing home. Patient prefers oral antibiotics over IV antibiotics for urinary tract infection even if suboptimal treatment. Sensitivities for oral abx include augmentin and cipro. QTc prolonged therefore given augmentin (renally dosed) for the remainder of 7 days of treatment. Dr Desai went over code status and goals of care again with patient and still agreed with plan. Transportation and hospice to be arranged by social work. Was hypertensive and anxious given antihypertensives and home lorazepam PO prior to discharge.        Goals of Care Treatment Preferences:  Code Status: DNR      Consults:   Consults (From admission, onward)        Status Ordering Provider     Inpatient consult to Social Work  Once        Provider:  (Not yet assigned)    REAGAN Garg     Inpatient consult to Social Work/Case Management  Once         Provider:  (Not yet assigned)    Acknowledged REAGAN HYMAN     IP consult to case management  Once        Provider:  (Not yet assigned)    Completed REAGAN HYMAN     Pulmonology  Once        Provider:  Natalio Kong MD    Completed JAY MAI     Cardiology  Once        Provider:  Lencho Romero MD    Completed JAY MAI        Final Active Diagnoses:    Diagnosis Date Noted POA    Constipation [K59.00] 01/02/2022 No    Goals of care, counseling/discussion [Z71.89] 01/02/2022 Not Applicable    Left lower lobe pneumonia [J18.9] 12/31/2021 Yes    Normocytic anemia [D64.9] 12/31/2021 Yes    Hyponatremia [E87.1] 12/31/2021 Yes    UTI due to extended-spectrum beta lactamase (ESBL) producing Escherichia coli [N39.0, B96.29, Z16.12] 12/31/2021 Yes    Chronic prescription benzodiazepine use [Z79.899] 12/31/2021 Not Applicable    Severe aortic stenosis [I35.0] 09/08/2020 Yes    Elevated troponin [R77.8] 08/29/2020 Yes    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] 02/07/2019 Yes    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] 03/29/2018 Yes    Hyperlipidemia [E78.5] 04/10/2017 Yes    Essential hypertension [I10] 09/15/2015 Yes    Type 2 diabetes mellitus with hyperglycemia [E11.65] 09/15/2015 Yes    Metastatic breast cancer [C50.919] 09/15/2015 Yes      Problems Resolved During this Admission:    Diagnosis Date Noted Date Resolved POA    PRINCIPAL PROBLEM:  Acute hypoxemic respiratory failure [J96.01] 12/30/2021 01/03/2022 Yes    TANIA (acute kidney injury) [N17.9] 12/31/2021 01/03/2022 Yes    Hypertensive emergency [I16.1] 12/31/2021 01/03/2022 Yes    SOB (shortness of breath) [R06.02] 12/30/2021 12/31/2021 Yes    Hyperkalemia [E87.5] 06/26/2021 01/03/2022 Yes    Acute on chronic diastolic congestive heart failure [I50.33] 09/15/2015 01/03/2022 Yes       Discharged Condition: stable    Disposition: care home Nursing Home    Follow Up:   Follow-up Information     [facility]. Go on 1/3/2022.    Why: Nursing Home Placement  Contact information:  [address]  [facility] [address]           [facility]. Go today.    Specialties: Hospice and Palliative Medicine, Hospice Services, Home Health Services  Why: Hospice  Contact information:  [address]  [address]  [facility] [address]  [phone]                       Medications:  Reconciled Home Medications:      Medication List      START taking these medications    amoxicillin-clavulanate 400-57 mg/5 mL Susr  Commonly known as: AUGMENTIN  Take 5 mLs (400 mg total) by mouth once daily. for 5 days  Start taking on: January 4, 2022        CHANGE how you take these medications    furosemide 40 MG tablet  Commonly known as: LASIX  Take 1 tablet (40 mg total) by mouth once daily.  ·      metoprolol succinate 25 MG 24 hr tablet  Commonly known as: TOPROL-XL  Take 1 tablet (25 mg total) by mouth once daily.        CONTINUE taking these medications    albuterol-ipratropium 2.5 mg-0.5 mg/3 mL nebulizer solution  Commonly known as: DUO-NEB  Take 3 mLs by nebulization every 6 (six) hours as needed for Wheezing. Rescue     amLODIPine 5 MG tablet  Commonly known as: NORVASC  TAKE 1 TABLET(5 MG) BY MOUTH EVERY DAY     aspirin 81 MG EC tablet  Commonly known as: ECOTRIN  Take 1 tablet (81 mg total) by mouth once daily.     atorvastatin 20 MG tablet  Commonly known as: LIPITOR  Take 1 tablet (20 mg total) by mouth once daily.     calcium carbonate-vitamin D3 600 mg-10 mcg (400 unit) Chew  Commonly known as: CALCIUM 600 WITH VITAMIN D3  Take 1 tablet by mouth.     glipiZIDE 2.5 MG Tr24  Commonly known as: GLUCOTROL  TAKE 1 TABLET(2.5 MG) BY MOUTH EVERY DAY     irbesartan 150 MG tablet  Commonly known as: AVAPRO  TAKE 1 TABLET(150 MG) BY MOUTH EVERY DAY     MISCELLANEOUS MEDICAL SUPPLY MISC  Compression sleeve; apply daily            Indwelling Lines/Drains at time of discharge:    Lines/Drains/Airways     Drain            Female External Urinary Catheter 01/01/22 1330 2 days                Time spent on the discharge of patient: > 35 minutes    Critical care time spent on the evaluation and treatment of severe organ dysfunction, review of pertinent labs and imaging studies, discussions with consulting providers and discussions with patient/family: > 35 minutes.     Sarai Desai MD  Department of Hospital Medicine  [facility] - Intensive Care

## 2022-01-03 NOTE — PLAN OF CARE
01/03/22 1608   Post-Acute Status   Post-Acute Authorization Placement   Post-Acute Placement Status Set-up Complete/Auth obtained   Coverage PHN   Discharge Delays (!) Change in Medical Condition   Discharge Plan   Discharge Plan A Return to nursing home   Discharge Plan B Return to Nursing Home       Per nurse Shannan: The ambulance people are here but BP remains high and the EMT told me they can not transport at this time. They are going to have another team come back in a couple of hours

## 2022-01-03 NOTE — PLAN OF CARE
[facility]     Department of Hospital Medicine     [address] [place] [address]     [phone] after hours  [phone] fax       NURSING HOME ORDERS    Patient Name: Vinita Andrews  YOB: [birth year]    Admit to Nursing Home:  Regular Bed     Consult hospice care    Diagnoses:  Active Hospital Problems    Diagnosis  POA    *Acute hypoxemic respiratory failure [J96.01]  Yes     Pulmonology consulted for vent management  Defer to them for ABGs as needed      Constipation [K59.00]  No    Goals of care, counseling/discussion [Z71.89]  Not Applicable    TANIA (acute kidney injury) [N17.9]  Yes    Hypertensive emergency [I16.1]  Yes    Left lower lobe pneumonia [J18.9]  Yes    Normocytic anemia [D64.9]  Yes    Hyponatremia [E87.1]  Yes    UTI due to extended-spectrum beta lactamase (ESBL) producing Escherichia coli [N39.0, B96.29, Z16.12]  Yes    Chronic prescription benzodiazepine use [Z79.899]  Not Applicable    Hyperkalemia [E87.5]  Yes    Severe aortic stenosis [I35.0]  Yes     Formatting of this note might be different from the original.  Added automatically from request for surgery [id number]      Elevated troponin [R77.8]  Yes     Formatting of this note might be different from the original.  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  Problem noted pre EPIC      Hyperlipidemia [E78.5]  Yes    Acute on chronic diastolic congestive heart failure [I50.33]  Yes    Essential hypertension [I10]  Yes    Type 2 diabetes mellitus with hyperglycemia [E11.65]  Yes    Metastatic breast cancer [C50.919]  Yes      Resolved Hospital Problems    Diagnosis Date Resolved POA    SOB (shortness of breath) [R06.02] 12/31/2021 Yes       Patient is  homebound due to:  Acute hypoxemic respiratory failure    Allergies:  Review of patient's allergies indicates:   Allergen Reactions    Ciprofloxacin hcl Nausea Only       Vitals:  Per facility protocol    Diet: diabetic diet: 2000 calorie     Acitivities:   - as tolerated    Nursing Precautions:    - Aspiration precautions:             -  Upright 90 degrees befor during and after meals    - Fall precautions per nursing home protocol   - Decubitus precautions:        -  for positioning   - Pressure reducing foam mattress   - Turn patient every two hours. Use wedge pillows to anchor patient    MISCELLANEOUS CARE:      Oxygen via low flow NC for goal O2 > 92%    Consult for hospice care    DIABETES CARE:       Check blood sugar:      Fingerstick blood sugar AC and HS      Report CBG < 60 or > 400 to physician.                                          Insulin Sliding Scale          Glucose  Novolog Insulin Subcutaneous        0 - 60   Orange juice or glucose tablet, hold insulin      No insulin   201-250  2 units   251-300  4 units   301-350  6 units   351-400  8 units   >400   10 units then call physician      Medications: Discontinue all previous medication orders, if any. See new list below.     Medication List      CONTINUE taking these medications    furosemide 40 MG tablet  Commonly known as: LASIX  Take 1 tablet (40 mg total) by mouth once daily.     metoprolol succinate 25 MG 24 hr tablet  Commonly known as: TOPROL-XL  Take 1 tablet (25 mg total) by mouth once daily.     albuterol-ipratropium 2.5 mg-0.5 mg/3 mL nebulizer solution  Commonly known as: DUO-NEB  Take 3 mLs by nebulization every 6 (six) hours as needed for Wheezing. Rescue     amLODIPine 5 MG tablet  Commonly known as: NORVASC  TAKE 1 TABLET(5 MG) BY MOUTH EVERY DAY     aspirin 81 MG EC tablet  Commonly known as: ECOTRIN  Take 1 tablet (81 mg total) by mouth once daily.     atorvastatin 20 MG tablet  Commonly known as: LIPITOR  Take 1  tablet (20 mg total) by mouth once daily.     calcium carbonate-vitamin D3 600 mg-10 mcg (400 unit) Chew  Commonly known as: CALCIUM 600 WITH VITAMIN D3  Give 1 tablet by mouth daily   irbesartan 150 MG tablet  Commonly known as: AVAPRO  TAKE 1 TABLET(150 MG) BY MOUTH EVERY DAY          Medication List      START taking these medications    amoxicillin-clavulanate 400-57 mg/5 mL Susr  Commonly known as: AUGMENTIN  Take 5 mLs (400 mg total) by mouth once daily. for 5 days  Start taking on: January 4, 2022       Electronically signed  _________________________________  Sarai Desai MD  01/03/2022

## 2022-01-03 NOTE — NURSING
[facility]ensive [facility]  ICU Shift Summary  Date: 1/3/2022      COVID Test: (--)  Isolation: No active isolations     Prehospitalization: Home  Admit Date / LOS : 12/30/2021/ 4 days    Diagnosis: Acute hypoxemic respiratory failure    Consults:        Active:        Needed: N/A     Code Status: DNR   Advanced Directive: <no information>    LDA: PIV       Central Lines/Site/Justification:Patient Does Not Have Central Line       Urinary Cath/Order/Justification:Patient Does Not Have Urinary Catheter    Vasopressors/Infusions:        GOALS: Volume/ Hemodynamic: SBP < 180                     RASS: +1 anxious, apprehensive but not aggressive    Pain Management: none       Pain Controlled: yes     Rhythm: NSR    Respiratory Device: Nasal Cannula    Oxygen Concentration (%):  [25] 25             Most Recent SBT/ SAT: N/A       MOVE Screen: PASS  ICU Liberation: yes    VTE Prophylaxis: Mechanical  Mobility: Bedrest  Stress Ulcer Prophylaxis: No    Dietary: PO  Tolerance: yes  Advancement: @ goal    I & O (24h):    Intake/Output Summary (Last 24 hours) at 1/3/2022 0647  Last data filed at 1/3/2022 0600  Gross per 24 hour   Intake 743.01 ml   Output 1450 ml   Net -706.99 ml        Restraints: No    Significant Dates:  Post Op Date: N/A  Rescue Date: N/A  Imaging/ Diagnostics: N/A    Noteworthy Labs:  See chart below    CBC/Anemia Labs: Coags:    Recent Labs   Lab 12/31/21 0234 01/01/22 0352   WBC 17.43* 9.45   HGB 10.7* 8.6*   HCT 32.5* 26.3*    228   MCV 96 96   RDW 14.9* 15.4*    No results for input(s): PT, INR, APTT in the last 168 hours.     Chemistries:   Recent Labs   Lab 12/30/21 1925 12/30/21  1925 12/31/21  0234 12/31/21  0241 01/01/22  0352 01/02/22  0333   *   < > 129*   < > 131* 132*   K 5.6*   < > 4.8   < > 4.0 4.0   CL 98   < > 94*   < > 95 96   CO2 19*   < > 22*   < > 26 27   BUN 35*   < > 37*   < > 36* 28*   CREATININE 1.9*   < > 1.9*   < > 1.5* 1.2   CALCIUM 9.7   < > 9.3   < > 8.4* 8.6*    PROT 8.1  --  7.6  --   --   --    BILITOT 0.4  --  0.4  --   --   --    ALKPHOS 62  --  55  --   --   --    ALT 23  --  21  --   --   --    AST 24  --  24  --   --   --    MG 2.2   < > 1.9  --  1.9  --    PHOS  --   --  3.7  --   --   --     < > = values in this interval not displayed.        Cardiac Enzymes: Ejection Fractions:    Recent Labs     12/31/21  0842   TROPONINI 0.095*    EF   Date Value Ref Range Status   07/09/2021 60 % Final        POCT Glucose: HbA1c:    Recent Labs   Lab 01/02/22  0726 01/02/22  1127 01/02/22  1637   POCTGLUCOSE 159* 208* 103    Hemoglobin A1C   Date Value Ref Range Status   12/31/2021 5.9 (H) 4.0 - 5.6 % Final     Comment:     ADA Screening Guidelines:  5.7-6.4%  Consistent with prediabetes  >or=6.5%  Consistent with diabetes    High levels of fetal hemoglobin interfere with the HbA1C  assay. Heterozygous hemoglobin variants (HbS, HgC, etc)do  not significantly interfere with this assay.   However, presence of multiple variants may affect accuracy.             ICU LOS 3d 6h  Level of Care: OK to Transfer    Chart Check: 24 HR Done  Shift Summary/Plan for the shift: see care plan summary

## 2022-01-03 NOTE — PROGRESS NOTES
CARINE notified patient's nurse of ETA for ambulance.   CARINE spoke with Harper ([facility]) informing her of ambulance  time.

## 2022-01-03 NOTE — PLAN OF CARE
01/03/22 1231   Medicare Message   Important Message from Medicare regarding Discharge Appeal Rights Given to patient/caregiver;Explained to patient/caregiver;Signed/date by patient/caregiver   Date IMM was signed 01/03/22   Time IMM was signed 1210

## 2022-01-03 NOTE — PROGRESS NOTES
CARINE spoke with Delia ([facility]). CARINE explained to Delia that patient is discharging today and that SW was following up to see if patient can be accepted back today. Delia explained to CARINE that the admission person is out today, and she will talk with administration to see if patient can return. CARINE informed Delia that pt may discharge with IV abx and asked if patient can return with IV abx? Delia stated NH does accept IV abx.  CARINE also explained patient will be returning with [facility].     CARINE spoke with Jenifer ([facility]) requesting for a ambulance authorization.    CARINE received call back from Delia ([facility]). Delia explained to CARINE that Margo ([facility]) is the only person that has access to [facility] and asked for SW to fax patient referral to .    CARINE manually faxed pt's H&P, Covid test, Consult, NH order, Physician notes, Labs, and MAR.     CARINE received call from Jenifer ([facility]). Jenifer provided CARINE with ambulance authorization # [id number] [facility].     11:53  CARINE left voice message for Delia ([facility]) requesting a return call.    Ambulance arranged for 1:00 pm  time.

## 2022-01-04 ENCOUNTER — LAB VISIT (OUTPATIENT)
Dept: LAB | Facility: OTHER | Age: 87
End: 2022-01-04
Payer: MEDICARE

## 2022-01-04 VITALS
HEART RATE: 80 BPM | RESPIRATION RATE: 33 BRPM | DIASTOLIC BLOOD PRESSURE: 70 MMHG | WEIGHT: 157.19 LBS | SYSTOLIC BLOOD PRESSURE: 150 MMHG | HEIGHT: 62 IN | BODY MASS INDEX: 28.93 KG/M2 | OXYGEN SATURATION: 96 % | TEMPERATURE: 99 F

## 2022-01-04 DIAGNOSIS — Z20.822 ENCOUNTER FOR LABORATORY TESTING FOR COVID-19 VIRUS: ICD-10-CM

## 2022-01-04 PROCEDURE — U0003 INFECTIOUS AGENT DETECTION BY NUCLEIC ACID (DNA OR RNA); SEVERE ACUTE RESPIRATORY SYNDROME CORONAVIRUS 2 (SARS-COV-2) (CORONAVIRUS DISEASE [COVID-19]), AMPLIFIED PROBE TECHNIQUE, MAKING USE OF HIGH THROUGHPUT TECHNOLOGIES AS DESCRIBED BY CMS-2020-01-R: HCPCS | Performed by: NURSE PRACTITIONER

## 2022-01-04 PROCEDURE — 25000003 PHARM REV CODE 250: Performed by: HOSPITALIST

## 2022-01-04 RX ADMIN — LORAZEPAM 0.5 MG: 0.5 TABLET ORAL at 06:01

## 2022-01-04 NOTE — NURSING
[facility] arrived to transport pt to [facility]. Pt A&O x3, all vital signs WNL, SPO2 99% on room air, all IV's and Pure Wick D/C'd. Lorazapam 0.5mg PO given per pt's request. Pt transferred to EMS care without incident.

## 2022-01-04 NOTE — NURSING
Called [facility] to check on the status of pt transfer. Dispatch stated it will be another 2 hours before they can come get pt. Will continue to inquire on transfer status.

## 2022-01-04 NOTE — PROGRESS NOTES
CARINE spoke with Napoleon (transportation center) re: ambulance transportation. Napoleon stated ETA is for 20:30.     CARINE notified patient's nurse Shannan of ambulance  time.

## 2022-01-04 NOTE — NURSING
Called [facility] to check on the status of pt transfer. Dispatch stated it will be another 2 hours before they can come get pt. Will continue to inquire on transfer status.

## 2022-01-06 LAB — SARS-COV-2 RNA RESP QL NAA+PROBE: NOT DETECTED

## 2022-01-11 ENCOUNTER — LAB VISIT (OUTPATIENT)
Dept: LAB | Facility: OTHER | Age: 87
End: 2022-01-11
Payer: MEDICARE

## 2022-01-11 DIAGNOSIS — Z20.822 ENCOUNTER FOR LABORATORY TESTING FOR COVID-19 VIRUS: ICD-10-CM

## 2022-01-11 PROCEDURE — U0003 INFECTIOUS AGENT DETECTION BY NUCLEIC ACID (DNA OR RNA); SEVERE ACUTE RESPIRATORY SYNDROME CORONAVIRUS 2 (SARS-COV-2) (CORONAVIRUS DISEASE [COVID-19]), AMPLIFIED PROBE TECHNIQUE, MAKING USE OF HIGH THROUGHPUT TECHNOLOGIES AS DESCRIBED BY CMS-2020-01-R: HCPCS | Performed by: NURSE PRACTITIONER

## 2022-01-12 LAB
SARS-COV-2 RNA RESP QL NAA+PROBE: NOT DETECTED
SARS-COV-2- CYCLE NUMBER: NORMAL

## 2022-01-18 ENCOUNTER — LAB VISIT (OUTPATIENT)
Dept: LAB | Facility: OTHER | Age: 87
End: 2022-01-18
Payer: MEDICARE

## 2022-01-18 DIAGNOSIS — Z20.822 ENCOUNTER FOR LABORATORY TESTING FOR COVID-19 VIRUS: ICD-10-CM

## 2022-01-18 PROCEDURE — U0003 INFECTIOUS AGENT DETECTION BY NUCLEIC ACID (DNA OR RNA); SEVERE ACUTE RESPIRATORY SYNDROME CORONAVIRUS 2 (SARS-COV-2) (CORONAVIRUS DISEASE [COVID-19]), AMPLIFIED PROBE TECHNIQUE, MAKING USE OF HIGH THROUGHPUT TECHNOLOGIES AS DESCRIBED BY CMS-2020-01-R: HCPCS | Performed by: NURSE PRACTITIONER

## 2022-01-19 LAB
SARS-COV-2 RNA RESP QL NAA+PROBE: NOT DETECTED
SARS-COV-2- CYCLE NUMBER: NORMAL

## 2022-01-25 ENCOUNTER — LAB VISIT (OUTPATIENT)
Dept: LAB | Facility: OTHER | Age: 87
End: 2022-01-25
Payer: MEDICARE

## 2022-01-25 DIAGNOSIS — Z20.822 ENCOUNTER FOR LABORATORY TESTING FOR COVID-19 VIRUS: ICD-10-CM

## 2022-01-25 LAB
SARS-COV-2 RNA RESP QL NAA+PROBE: NOT DETECTED
SARS-COV-2- CYCLE NUMBER: NORMAL

## 2022-01-25 PROCEDURE — U0003 INFECTIOUS AGENT DETECTION BY NUCLEIC ACID (DNA OR RNA); SEVERE ACUTE RESPIRATORY SYNDROME CORONAVIRUS 2 (SARS-COV-2) (CORONAVIRUS DISEASE [COVID-19]), AMPLIFIED PROBE TECHNIQUE, MAKING USE OF HIGH THROUGHPUT TECHNOLOGIES AS DESCRIBED BY CMS-2020-01-R: HCPCS | Performed by: EMERGENCY MEDICINE

## 2022-02-01 ENCOUNTER — LAB VISIT (OUTPATIENT)
Dept: LAB | Facility: OTHER | Age: 87
End: 2022-02-01
Payer: MEDICARE

## 2022-02-01 DIAGNOSIS — Z20.822 ENCOUNTER FOR LABORATORY TESTING FOR COVID-19 VIRUS: ICD-10-CM

## 2022-02-01 PROCEDURE — U0003 INFECTIOUS AGENT DETECTION BY NUCLEIC ACID (DNA OR RNA); SEVERE ACUTE RESPIRATORY SYNDROME CORONAVIRUS 2 (SARS-COV-2) (CORONAVIRUS DISEASE [COVID-19]), AMPLIFIED PROBE TECHNIQUE, MAKING USE OF HIGH THROUGHPUT TECHNOLOGIES AS DESCRIBED BY CMS-2020-01-R: HCPCS | Performed by: EMERGENCY MEDICINE

## 2022-02-02 LAB
SARS-COV-2 RNA RESP QL NAA+PROBE: NOT DETECTED
SARS-COV-2- CYCLE NUMBER: NORMAL

## 2022-02-08 ENCOUNTER — LAB VISIT (OUTPATIENT)
Dept: LAB | Facility: OTHER | Age: 87
End: 2022-02-08
Payer: MEDICARE

## 2022-02-08 DIAGNOSIS — Z20.822 ENCOUNTER FOR LABORATORY TESTING FOR COVID-19 VIRUS: ICD-10-CM

## 2022-02-08 PROCEDURE — U0003 INFECTIOUS AGENT DETECTION BY NUCLEIC ACID (DNA OR RNA); SEVERE ACUTE RESPIRATORY SYNDROME CORONAVIRUS 2 (SARS-COV-2) (CORONAVIRUS DISEASE [COVID-19]), AMPLIFIED PROBE TECHNIQUE, MAKING USE OF HIGH THROUGHPUT TECHNOLOGIES AS DESCRIBED BY CMS-2020-01-R: HCPCS | Performed by: EMERGENCY MEDICINE

## 2022-02-09 LAB
SARS-COV-2 RNA RESP QL NAA+PROBE: NOT DETECTED
SARS-COV-2- CYCLE NUMBER: NORMAL

## 2022-02-15 ENCOUNTER — LAB VISIT (OUTPATIENT)
Dept: LAB | Facility: OTHER | Age: 87
End: 2022-02-15
Payer: MEDICARE

## 2022-02-15 DIAGNOSIS — Z20.822 ENCOUNTER FOR LABORATORY TESTING FOR COVID-19 VIRUS: ICD-10-CM

## 2022-02-15 PROCEDURE — U0003 INFECTIOUS AGENT DETECTION BY NUCLEIC ACID (DNA OR RNA); SEVERE ACUTE RESPIRATORY SYNDROME CORONAVIRUS 2 (SARS-COV-2) (CORONAVIRUS DISEASE [COVID-19]), AMPLIFIED PROBE TECHNIQUE, MAKING USE OF HIGH THROUGHPUT TECHNOLOGIES AS DESCRIBED BY CMS-2020-01-R: HCPCS | Performed by: EMERGENCY MEDICINE

## 2022-02-16 LAB
SARS-COV-2 RNA RESP QL NAA+PROBE: NOT DETECTED
SARS-COV-2- CYCLE NUMBER: NORMAL

## 2022-02-22 ENCOUNTER — LAB VISIT (OUTPATIENT)
Dept: LAB | Facility: OTHER | Age: 87
End: 2022-02-22
Payer: MEDICARE

## 2022-02-22 DIAGNOSIS — Z20.822 ENCOUNTER FOR LABORATORY TESTING FOR COVID-19 VIRUS: ICD-10-CM

## 2022-02-22 PROCEDURE — U0003 INFECTIOUS AGENT DETECTION BY NUCLEIC ACID (DNA OR RNA); SEVERE ACUTE RESPIRATORY SYNDROME CORONAVIRUS 2 (SARS-COV-2) (CORONAVIRUS DISEASE [COVID-19]), AMPLIFIED PROBE TECHNIQUE, MAKING USE OF HIGH THROUGHPUT TECHNOLOGIES AS DESCRIBED BY CMS-2020-01-R: HCPCS | Performed by: EMERGENCY MEDICINE

## 2022-02-23 LAB
SARS-COV-2 RNA RESP QL NAA+PROBE: NOT DETECTED
SARS-COV-2- CYCLE NUMBER: NORMAL

## 2022-03-08 ENCOUNTER — LAB VISIT (OUTPATIENT)
Dept: LAB | Facility: OTHER | Age: 87
End: 2022-03-08
Payer: MEDICARE

## 2022-03-08 DIAGNOSIS — Z20.822 ENCOUNTER FOR LABORATORY TESTING FOR COVID-19 VIRUS: ICD-10-CM

## 2022-03-08 PROCEDURE — U0003 INFECTIOUS AGENT DETECTION BY NUCLEIC ACID (DNA OR RNA); SEVERE ACUTE RESPIRATORY SYNDROME CORONAVIRUS 2 (SARS-COV-2) (CORONAVIRUS DISEASE [COVID-19]), AMPLIFIED PROBE TECHNIQUE, MAKING USE OF HIGH THROUGHPUT TECHNOLOGIES AS DESCRIBED BY CMS-2020-01-R: HCPCS | Performed by: EMERGENCY MEDICINE

## 2022-03-09 LAB
SARS-COV-2 RNA RESP QL NAA+PROBE: NOT DETECTED
SARS-COV-2- CYCLE NUMBER: NORMAL

## 2024-02-04 NOTE — ASSESSMENT & PLAN NOTE
Code status = DNR  Interested in hospice  Social work consulted  Palliative consulted    Plan hospice at nursing home on discharge   3 = A little assistance

## 2025-06-20 NOTE — ASSESSMENT & PLAN NOTE
IUD in place   Suspect due to hypertensive emergency   Troponins are flat.   - continue asa, plavix, statin

## 2025-07-01 NOTE — NURSING
"Patient Education     Routine physical for adults   The Basics   Written by the doctors and editors at [facility]   What is a physical? -- A physical is a routine visit, or \"check-up,\" with your doctor. You might also hear it called a \"wellness visit\" or \"preventive visit.\"  During each visit, the doctor will:   Ask about your physical and mental health   Ask about your habits, behaviors, and lifestyle   Do an exam   Give you vaccines if needed   Talk to you about any medicines you take   Give advice about your health   Answer your questions  Getting regular check-ups is an important part of taking care of your health. It can help your doctor find and treat any problems you have. But it's also important for preventing health problems.  A routine physical is different from a \"sick visit.\" A sick visit is when you see a doctor because of a health concern or problem. Since physicals are scheduled ahead of time, you can think about what you want to ask the doctor.  How often should I get a physical? -- It depends on your age and health. In general, for people age 21 years and older:   If you are younger than 50 years, you might be able to get a physical every 3 years.   If you are 50 years or older, your doctor might recommend a physical every year.  If you have an ongoing health condition, like diabetes or high blood pressure, your doctor will probably want to see you more often.  What happens during a physical? -- In general, each visit will include:   Physical exam - The doctor or nurse will check your height, weight, heart rate, and blood pressure. They will also look at your eyes and ears. They will ask about how you are feeling and whether you have any symptoms that bother you.   Medicines - It's a good idea to bring a list of all the medicines you take to each doctor visit. Your doctor will talk to you about your medicines and answer any questions. Tell them if you are having any side effects that bother you. You " Called [facility] to check on the status of pt transfer. Dispatch stated it will be around [id number] when they can come get pt. Will continue to inquire on transfer status.     "should also tell them if you are having trouble paying for any of your medicines.   Habits and behaviors - This includes:   Your diet   Your exercise habits   Whether you smoke, drink alcohol, or use drugs   Whether you are sexually active   Whether you feel safe at home  Your doctor will talk to you about things you can do to improve your health and lower your risk of health problems. They will also offer help and support. For example, if you want to quit smoking, they can give you advice and might prescribe medicines. If you want to improve your diet or get more physical activity, they can help you with this, too.   Lab tests, if needed - The tests you get will depend on your age and situation. For example, your doctor might want to check your:   Cholesterol   Blood sugar   Iron level   Vaccines - The recommended vaccines will depend on your age, health, and what vaccines you already had. Vaccines are very important because they can prevent certain serious or deadly infections.   Discussion of screening - \"Screening\" means checking for diseases or other health problems before they cause symptoms. Your doctor can recommend screening based on your age, risk, and preferences. This might include tests to check for:   Cancer, such as breast, prostate, cervical, ovarian, colorectal, prostate, lung, or skin cancer   Sexually transmitted infections, such as chlamydia and gonorrhea   Mental health conditions like depression and anxiety  Your doctor will talk to you about the different types of screening tests. They can help you decide which screenings to have. They can also explain what the results might mean.   Answering questions - The physical is a good time to ask the doctor or nurse questions about your health. If needed, they can refer you to other doctors or specialists, too.  Adults older than 65 years often need other care, too. As you get older, your doctor will talk to you about:   How to prevent falling at " home   Hearing or vision tests   Memory testing   How to take your medicines safely   Making sure that you have the help and support you need at home  All topics are updated as new evidence becomes available and our peer review process is complete.  This topic retrieved from [employer] on: May 02, 2024.  Topic 317184 Version 1.0  Release: 32.4.3 - C32.122  © 2024 UpToDate, Inc. and/or its affiliates. All rights reserved.  Consumer Information Use and Disclaimer   Disclaimer: This generalized information is a limited summary of diagnosis, treatment, and/or medication information. It is not meant to be comprehensive and should be used as a tool to help the user understand and/or assess potential diagnostic and treatment options. It does NOT include all information about conditions, treatments, medications, side effects, or risks that may apply to a specific patient. It is not intended to be medical advice or a substitute for the medical advice, diagnosis, or treatment of a health care provider based on the health care provider's examination and assessment of a patient's specific and unique circumstances. Patients must speak with a health care provider for complete information about their health, medical questions, and treatment options, including any risks or benefits regarding use of medications. This information does not endorse any treatments or medications as safe, effective, or approved for treating a specific patient. Up[employer]Date, Inc. and its affiliates disclaim any warranty or liability relating to this information or the use thereof.The use of this information is governed by the Terms of Use, available at https://www.woltersQomutyuwer.com/en/know/clinical-effectiveness-terms. 2024© UpToDate, Inc. and its affiliates and/or licensors. All rights reserved.  Copyright   © 2024 Up[employer], Inc. and/or its affiliates. All rights reserved.